# Patient Record
Sex: MALE | Race: WHITE | NOT HISPANIC OR LATINO | Employment: FULL TIME | URBAN - NONMETROPOLITAN AREA
[De-identification: names, ages, dates, MRNs, and addresses within clinical notes are randomized per-mention and may not be internally consistent; named-entity substitution may affect disease eponyms.]

---

## 2018-09-25 ENCOUNTER — OFFICE VISIT (OUTPATIENT)
Dept: FAMILY MEDICINE CLINIC | Facility: CLINIC | Age: 63
End: 2018-09-25

## 2018-09-25 VITALS
WEIGHT: 223.2 LBS | SYSTOLIC BLOOD PRESSURE: 108 MMHG | HEART RATE: 93 BPM | TEMPERATURE: 98.5 F | HEIGHT: 68 IN | BODY MASS INDEX: 33.83 KG/M2 | DIASTOLIC BLOOD PRESSURE: 70 MMHG | OXYGEN SATURATION: 95 %

## 2018-09-25 DIAGNOSIS — I25.118 CORONARY ARTERY DISEASE OF NATIVE ARTERY OF NATIVE HEART WITH STABLE ANGINA PECTORIS (HCC): ICD-10-CM

## 2018-09-25 DIAGNOSIS — M65.341 TRIGGER RING FINGER OF RIGHT HAND: ICD-10-CM

## 2018-09-25 DIAGNOSIS — G80.9 CEREBRAL PALSY, UNSPECIFIED TYPE (HCC): ICD-10-CM

## 2018-09-25 DIAGNOSIS — M25.532 LEFT WRIST PAIN: Primary | ICD-10-CM

## 2018-09-25 DIAGNOSIS — Z85.528 PERSONAL HISTORY OF RENAL CANCER: ICD-10-CM

## 2018-09-25 PROCEDURE — 99204 OFFICE O/P NEW MOD 45 MIN: CPT | Performed by: FAMILY MEDICINE

## 2018-09-25 RX ORDER — ALPRAZOLAM 0.5 MG/1
TABLET ORAL
Refills: 1 | COMMUNITY
Start: 2018-09-15 | End: 2018-12-04

## 2018-09-25 RX ORDER — METOPROLOL SUCCINATE 25 MG/1
25 TABLET, EXTENDED RELEASE ORAL DAILY
COMMUNITY
End: 2019-06-25 | Stop reason: SDUPTHER

## 2018-09-25 RX ORDER — ASPIRIN 81 MG/1
81 TABLET ORAL DAILY
COMMUNITY

## 2018-09-25 RX ORDER — LISINOPRIL AND HYDROCHLOROTHIAZIDE 20; 12.5 MG/1; MG/1
1 TABLET ORAL DAILY
COMMUNITY
End: 2019-01-28 | Stop reason: SDUPTHER

## 2018-09-25 RX ORDER — ISOSORBIDE MONONITRATE 30 MG/1
30 TABLET, EXTENDED RELEASE ORAL DAILY
COMMUNITY
End: 2019-06-25 | Stop reason: SDUPTHER

## 2018-09-25 RX ORDER — TRAZODONE HYDROCHLORIDE 50 MG/1
50-150 TABLET ORAL NIGHTLY
Qty: 90 TABLET | Refills: 11 | Status: SHIPPED | OUTPATIENT
Start: 2018-09-25 | End: 2019-04-17

## 2018-09-25 RX ORDER — ATORVASTATIN CALCIUM 20 MG/1
20 TABLET, FILM COATED ORAL NIGHTLY
COMMUNITY
End: 2019-03-14 | Stop reason: SDUPTHER

## 2018-09-25 NOTE — PROGRESS NOTES
Subjective   Jimmie Ortega is a 63 y.o. male.     History of Present Illness     New patient from [place].  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  Needs some referrals.  Due to stress of cancer, started using xanax to help him sleep.  Has been on 1.5 yrs. Says he is not addicted.  Has gone 2-3 days at most before taking xanax at bedtime.  His problem is falling asleep.  He has a trigger finger reoccur, needs referral.  Referral to nephrologist or urologist due to history of left nephrectomy 2017 due to cancer.  Last grf in 40's with serum creatinine 1.5  Needs referral to cardiologist due to coronary artery disease.  He has 100% occlusion in one native artery but good collateral flow so no stenting performed.  Before cad discovered, had pe left lung.  He was on blood thinner but after discussion and 23 tubes of blood to determine likelyhood of a repeat pe, they decided due to his age, he would be at more risk staying on a blood thinner for life.   He says he had some scarring in his left lung but is ok.  Hyperextended left wrist 6 weeks ago and ever since, wrist pain. 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 First wife  of liver cancer.  He is remarried.  New job preacher [employer]   Fh:  Mom lived age 93, dad  61    Review of Systems   Constitutional: Negative for chills, fatigue and fever.   HENT: Negative for congestion, ear discharge, ear pain, facial swelling, hearing loss, postnasal drip, rhinorrhea, sinus pressure, sore throat, trouble swallowing and voice change.    Eyes: Negative for discharge, redness and visual disturbance.   Respiratory: Negative for cough, chest tightness, shortness of breath and wheezing.    Cardiovascular:  "Negative for chest pain and palpitations.   Gastrointestinal: Negative for abdominal pain, blood in stool, constipation, diarrhea, nausea and vomiting.   Endocrine: Negative for polydipsia and polyuria.   Genitourinary: Negative for dysuria, flank pain, hematuria and urgency.   Musculoskeletal: Negative for arthralgias, back pain, joint swelling and myalgias.   Skin: Negative for rash.   Neurological: Negative for dizziness, weakness, numbness and headaches.   Hematological: Negative for adenopathy.   Psychiatric/Behavioral: Negative for confusion and sleep disturbance. The patient is not nervous/anxious.            /70 (BP Location: Left arm, Patient Position: Sitting, Cuff Size: Adult)   Pulse 93   Temp 98.5 °F (36.9 °C) (Temporal Artery )   Ht 172.2 cm (67.8\")   Wt 101 kg (223 lb 3.2 oz)   SpO2 95%   BMI 34.14 kg/m²       Objective     Physical Exam   Constitutional: He is oriented to person, place, and time. He appears well-developed and well-nourished.   HENT:   Head: Normocephalic and atraumatic.   Right Ear: External ear normal.   Left Ear: External ear normal.   Nose: Nose normal.   Eyes: Pupils are equal, round, and reactive to light. Conjunctivae and EOM are normal.   Neck: Normal range of motion.   Pulmonary/Chest: Effort normal.   Musculoskeletal: Normal range of motion.   Neurological: He is alert and oriented to person, place, and time.   Psychiatric: He has a normal mood and affect. His behavior is normal. Judgment and thought content normal.   Nursing note and vitals reviewed.          PAST MEDICAL HISTORY   No past medical history on file.   PAST SURGICAL HISTORY   No past surgical history on file.   SOCIAL HISTORY     Social History     Social History   • Marital status:      Social History Main Topics   • Smoking status: Never Smoker   • Smokeless tobacco: Never Used   • Alcohol use Yes   • Drug use: No   • Sexual activity: Defer     Other Topics Concern   • Not on file    "   ALLERGIES   Morphine   MEDICATIONS     Current Outpatient Prescriptions   Medication Sig Dispense Refill   • ALPRAZolam (XANAX) 0.5 MG tablet TK 1 T PO  QD PRF SLEEP  1   • aspirin 81 MG EC tablet Take 81 mg by mouth Daily.     • atorvastatin (LIPITOR) 20 MG tablet Take 20 mg by mouth Every Night.     • isosorbide mononitrate (IMDUR) 30 MG 24 hr tablet Take 30 mg by mouth Daily.     • lisinopril-hydrochlorothiazide (PRINZIDE,ZESTORETIC) 20-12.5 MG per tablet Take 1 tablet by mouth Daily.     • loratadine (CLARITIN) 5 MG chewable tablet Chew 5 mg Daily.     • metoprolol succinate XL (TOPROL-XL) 25 MG 24 hr tablet Take 25 mg by mouth Daily.     • traZODone (DESYREL) 50 MG tablet Take 1-3 tablets by mouth Every Night. 90 tablet 11     No current facility-administered medications for this visit.         The following portions of the patient's history were reviewed and updated as appropriate: allergies, current medications, past family history, past medical history, past social history, past surgical history and problem list.        Assessment/Plan   Jimmie was seen today for establish care.    Diagnoses and all orders for this visit:    Left wrist pain  -     XR Wrist 3+ View Left    Coronary artery disease of native artery of native heart with stable angina pectoris (CMS/HCC)  -     Ambulatory Referral to Cardiology    Personal history of renal cancer  -     Ambulatory Referral to Nephrology    Trigger ring finger of right hand  -     Ambulatory Referral to Orthopedic Surgery    Cerebral palsy, unspecified type (CMS/HCC)    Other orders  -     traZODone (DESYREL) 50 MG tablet; Take 1-3 tablets by mouth Every Night.      Discussed xanax, taper off. If cant go off I will give klonopin to help taper off  If needed go to sleep md.  Try trazodone    Referrals    Left wrist splint to wear when sleeping.  I see no fracture on xray.                 No Follow-up on file.                  This document has been electronically  signed by Jimmie Mathwe MD on September 25, 2018 5:09 PM

## 2018-09-25 NOTE — PATIENT INSTRUCTIONS

## 2018-09-28 ENCOUNTER — OFFICE VISIT (OUTPATIENT)
Dept: ORTHOPEDIC SURGERY | Facility: CLINIC | Age: 63
End: 2018-09-28

## 2018-09-28 VITALS — BODY MASS INDEX: 32.73 KG/M2 | HEIGHT: 69 IN | WEIGHT: 221 LBS

## 2018-09-28 DIAGNOSIS — M25.532 LEFT WRIST PAIN: Primary | ICD-10-CM

## 2018-09-28 DIAGNOSIS — M65.321 TRIGGER INDEX FINGER OF RIGHT HAND: ICD-10-CM

## 2018-09-28 DIAGNOSIS — S62.022A CLOSED DISPLACED FRACTURE OF MIDDLE THIRD OF SCAPHOID BONE OF LEFT WRIST, INITIAL ENCOUNTER: ICD-10-CM

## 2018-09-28 PROCEDURE — 99203 OFFICE O/P NEW LOW 30 MIN: CPT | Performed by: ORTHOPAEDIC SURGERY

## 2018-09-28 PROCEDURE — 25622 CLTX CARPL SCPHD FX W/O MNPJ: CPT | Performed by: ORTHOPAEDIC SURGERY

## 2018-09-28 PROCEDURE — 20550 NJX 1 TENDON SHEATH/LIGAMENT: CPT | Performed by: ORTHOPAEDIC SURGERY

## 2018-09-28 RX ORDER — TRIAMCINOLONE ACETONIDE 40 MG/ML
40 INJECTION, SUSPENSION INTRA-ARTICULAR; INTRAMUSCULAR
Status: COMPLETED | OUTPATIENT
Start: 2018-09-28 | End: 2018-09-28

## 2018-09-28 RX ORDER — LIDOCAINE HYDROCHLORIDE 10 MG/ML
1 INJECTION, SOLUTION INFILTRATION; PERINEURAL
Status: COMPLETED | OUTPATIENT
Start: 2018-09-28 | End: 2018-09-28

## 2018-09-28 RX ADMIN — TRIAMCINOLONE ACETONIDE 40 MG: 40 INJECTION, SUSPENSION INTRA-ARTICULAR; INTRAMUSCULAR at 14:33

## 2018-09-28 RX ADMIN — LIDOCAINE HYDROCHLORIDE 1 ML: 10 INJECTION, SOLUTION INFILTRATION; PERINEURAL at 14:33

## 2018-09-28 NOTE — PROGRESS NOTES
Jimmie Ortega is a 63 y.o. male   Primary provider:  Jimmie Mathew MD       Chief Complaint   Patient presents with   • Left Wrist - Wrist Pain       HISTORY OF PRESENT ILLNESS: XD 3-year-old male with a history of cerebral palsy affecting the left side mainly the upper extremity.  His left wrist pain started about 6 weeks ago, referred by Dr. Mathew. xrays done on 9/26/2018. Patient does remember an injury in he was moving recently and was moving some heavy boxes, the boxes came back and hyperextended his left wrist.  It got some better but then he saw Dr. mathew was concerned and referred him here with an x-ray which showed a questionable scaphoid fracture.  His left hand is uses a helping hand and sometimes moves when moving the right hand.  He is compensated very well with this throughout his life really has no active use of his left hand and really compensates for his right hand.  He also has a trigger finger on the long finger right hand which she has been concerned about.  He has issues with his neck and back. Patient wearing wrist splint.     Wrist Pain    The pain is present in the left wrist. The current episode started more than 1 month ago. There has been no history of extremity trauma. The problem occurs intermittently. The problem has been unchanged. The quality of the pain is described as aching. The pain is mild. Associated symptoms include numbness. Pertinent negatives include no fever. He has tried heat and cold for the symptoms.        CONCURRENT MEDICAL HISTORY:    Past Medical History:   Diagnosis Date   • Cardiac disease    • Cerebral palsy (CMS/HCC)    • Renal cancer (CMS/HCC)        Allergies   Allergen Reactions   • Morphine Nausea Only         Current Outpatient Prescriptions:   •  ALPRAZolam (XANAX) 0.5 MG tablet, TK 1 T PO  QD PRF SLEEP, Disp: , Rfl: 1  •  aspirin 81 MG EC tablet, Take 81 mg by mouth Daily., Disp: , Rfl:   •  atorvastatin (LIPITOR) 20 MG tablet, Take 20 mg by mouth  Every Night., Disp: , Rfl:   •  isosorbide mononitrate (IMDUR) 30 MG 24 hr tablet, Take 30 mg by mouth Daily., Disp: , Rfl:   •  lisinopril-hydrochlorothiazide (PRINZIDE,ZESTORETIC) 20-12.5 MG per tablet, Take 1 tablet by mouth Daily., Disp: , Rfl:   •  loratadine (CLARITIN) 5 MG chewable tablet, Chew 5 mg Daily., Disp: , Rfl:   •  metoprolol succinate XL (TOPROL-XL) 25 MG 24 hr tablet, Take 25 mg by mouth Daily., Disp: , Rfl:   •  traZODone (DESYREL) 50 MG tablet, Take 1-3 tablets by mouth Every Night., Disp: 90 tablet, Rfl: 11    Past Surgical History:   Procedure Laterality Date   • APPENDECTOMY     • NEPHRECTOMY     • TENDON REPAIR         Family History   Problem Relation Age of Onset   • Heart disease Other    • Hypertension Other         Social History     Social History   • Marital status:      Spouse name: N/A   • Number of children: N/A   • Years of education: N/A     Occupational History   • Not on file.     Social History Main Topics   • Smoking status: Never Smoker   • Smokeless tobacco: Never Used   • Alcohol use Yes   • Drug use: No   • Sexual activity: Defer     Other Topics Concern   • Not on file     Social History Narrative   • No narrative on file        Review of Systems   Constitutional: Positive for activity change. Negative for chills and fever.   HENT: Negative for facial swelling.    Eyes: Negative for photophobia.   Respiratory: Negative for apnea and shortness of breath.    Cardiovascular: Negative for chest pain and leg swelling.   Gastrointestinal: Negative for abdominal pain, nausea and vomiting.   Genitourinary: Negative for dysuria.   Musculoskeletal: Positive for arthralgias, back pain, gait problem, joint swelling, neck pain and neck stiffness.        Joint  Pain, muscle pain   Skin: Negative for color change and rash.   Neurological: Positive for weakness and numbness. Negative for seizures and syncope.        Tingling   Psychiatric/Behavioral: Negative for behavioral  "problems and dysphoric mood.   All other systems reviewed and are negative.      PHYSICAL EXAMINATION:       Ht 174 cm (68.5\")   Wt 100 kg (221 lb)   BMI 33.11 kg/m²     Physical Exam   Constitutional: He is oriented to person, place, and time. He appears well-developed and well-nourished.   HENT:   Head: Normocephalic and atraumatic.   Eyes: Pupils are equal, round, and reactive to light. EOM are normal.   Neck: Neck supple. No tracheal deviation present.   Pulmonary/Chest: Effort normal.   Musculoskeletal: Normal range of motion. He exhibits tenderness. He exhibits no edema.   Neurological: He is alert and oriented to person, place, and time. A sensory deficit is present. He exhibits abnormal muscle tone.   Skin: Skin is warm and dry. No erythema.   Psychiatric: He has a normal mood and affect.   Vitals reviewed.      GAIT:     []  Normal  []  Antalgic    Assistive device: [x]  None  []  Walker     []  Crutches  []  Cane     []  Wheelchair  []  Stretcher    Ortho Exam  Triggering noted in the long finger right hand tender A1 pulley neurologically intact motion fairly well preserved.    He is tender over the anatomic snuffbox on the left side decreased motion is here.  He has decreased flexion and extension pronation supination.    Xr Wrist 3+ View Left    Result Date: 9/26/2018  Narrative: Procedure: XR WRIST 3 OR MORE VIEWS. History: injured wrist., M25.532 Pain in left wrist. Comparison: None Findings: Three views of the left wrist were obtained. There is a transverse fracture through the waist of the scaphoid. The fracture is nondisplaced. Ill-defined areas of lucency around the fracture may be due to developing osteonecrosis. There is soft tissue swelling around the wrist.     Impression: Impression: Nondisplaced scaphoid fracture with associated ill-defined lucencies around the fracture suspicious for developing osteonecrosis. Electronically signed by:  Lorenzo Keating MD  9/26/2018 4:32 PM CDT Workstation: " [id number]  And looking at his x-rays he also has somewhat of a instability pattern with dorsal angulation of the carpus at the capitate lunate joint.  There is mild arthritic change noted here.  There is a impacted scaphoid fracture which looks subacute with mild sclerosis.  Small Joint Arthrocentesis  Consent given by: patient  Timeout: Immediately prior to procedure a time out was called to verify the correct patient, procedure, equipment, support staff and site/side marked as required   Supporting Documentation  Indications: pain   Procedure Details  Location: index finger (right ) -   Needle size: 25 G  Medications administered: 1 mL lidocaine 1 %; 40 mg triamcinolone acetonide 40 MG/ML                ASSESSMENT:    Diagnoses and all orders for this visit:    Left wrist pain    Trigger index finger of right hand  -     Small Joint Arthrocentesis    Closed displaced fracture of middle third of scaphoid bone of left wrist, initial encounter    Other orders  -     Cancel: Small Joint Arthrocentesis  -     Cancel: Medium Joint Arthrocentesis          PLAN I will inject the trigger fingers 11 mixture of Xylocaine and all goes above.  He will ice it down tonight and will follow this along.  With regard to the scaphoid fracture with an immobilizer in thumb spica splint he has a volar splint at this point.  I would be concerned that he may have an old chronic deformity and he tells me that he wouldn't necessarily have noticed that in the past.  He may have had repetitive traumatic injuries but nothing really slowed him down with regard to his wrist.  With limited function he has a don't think that he needs a operation on this at this point.  I do see him back in 4 weeks x-raying arrival of his left wrist.  He is give me a folder of CT scans information regarding his back and his neck as well as some leg length discrepancy on the left side.  I've put him I will go over these will discuss this further when we returns in 4  weeks.  We spent over 30 minutes discussing this today.    No Follow-up on file.    Kvng Mann MD

## 2018-10-08 ENCOUNTER — OFFICE VISIT (OUTPATIENT)
Dept: CARDIOLOGY | Facility: CLINIC | Age: 63
End: 2018-10-08

## 2018-10-08 VITALS
HEIGHT: 69 IN | SYSTOLIC BLOOD PRESSURE: 130 MMHG | OXYGEN SATURATION: 99 % | HEART RATE: 86 BPM | DIASTOLIC BLOOD PRESSURE: 80 MMHG | BODY MASS INDEX: 33.02 KG/M2 | WEIGHT: 222.9 LBS

## 2018-10-08 DIAGNOSIS — E78.2 MIXED HYPERLIPIDEMIA: ICD-10-CM

## 2018-10-08 DIAGNOSIS — I25.10 CORONARY ARTERY DISEASE INVOLVING NATIVE CORONARY ARTERY OF NATIVE HEART WITHOUT ANGINA PECTORIS: Primary | ICD-10-CM

## 2018-10-08 DIAGNOSIS — E66.09 CLASS 1 OBESITY DUE TO EXCESS CALORIES WITHOUT SERIOUS COMORBIDITY WITH BODY MASS INDEX (BMI) OF 34.0 TO 34.9 IN ADULT: ICD-10-CM

## 2018-10-08 DIAGNOSIS — E66.09 CLASS 1 OBESITY DUE TO EXCESS CALORIES WITHOUT SERIOUS COMORBIDITY WITH BODY MASS INDEX (BMI) OF 33.0 TO 33.9 IN ADULT: ICD-10-CM

## 2018-10-08 DIAGNOSIS — I10 ESSENTIAL HYPERTENSION: ICD-10-CM

## 2018-10-08 PROCEDURE — 93000 ELECTROCARDIOGRAM COMPLETE: CPT | Performed by: INTERNAL MEDICINE

## 2018-10-08 PROCEDURE — 99204 OFFICE O/P NEW MOD 45 MIN: CPT | Performed by: INTERNAL MEDICINE

## 2018-10-08 NOTE — PATIENT INSTRUCTIONS

## 2018-10-08 NOTE — PROGRESS NOTES
Cardiovascular Medicine      Kvng Frank M.D., Ph.D., [facility]         Jimmie Mathew MD  [address] [place] [address], [place] [address]    Thank you for asking me to see Jimmie Ortega for ASCAD.    History of Present Illness  This is a 63 y.o. male with:    1. ASCAD  A. CPS with MPS abnormal  [facility], 6/2017   -mLAD: 100% with R-L and L-L collateralization  - considered, but not performed   -pLAD: 50-60%  -D1: 30%  -D2: 80%, small vessel  -RCA: non-obstructive  2.  Dyslipidemia  3.  Hypertension, essential managed by primary care  4.  Obesity  5. Prior PE    Jimmie Ortega is a 63 y.o. male who presents for consultation today.  He's recently moved to the area.  He is a preacher.  He established care with Dr. Mathew.  He reported a history of atherosclerotic coronary artery disease including 100% mLAD lesion with collateralization from L-L and R-L. He has had a small D2 that was 80%.  He was having angina which prompted a myocardial perfusion scintigraphy with inducible anterior ischemia.  He underwent invasive coronary angiography with the above findings.   was discussed, but he became asymptomatic with antianginal medications, so he opted for ongoing medication therapy.  He's had no history of stenting or bypass surgery.  He is currently prescribed aspirin, isosorbide mononitrate, Toprol-XL and atorvastatin.  He's had no resting, exertional or nocturnal angina.  Concerning his dyslipidemia he is on atorvastatin.  This is prescribed as primary care provider.  He's tolerating this well.  He is asymptomatic from a cardiovascular standpoint.    Review of Systems - Review of Systems   Cardiovascular: Negative.    Respiratory: Negative.         All other systems were reviewed and were negative.    family history includes Heart disease in his other; Hypertension in his other.     reports that he has never smoked. He has never used smokeless tobacco. He reports that he drinks alcohol. He reports  that he does not use drugs.    Allergies   Allergen Reactions   • Morphine Nausea Only         Current Outpatient Prescriptions:   •  ALPRAZolam (XANAX) 0.5 MG tablet, TK 1 T PO  QD PRF SLEEP, Disp: , Rfl: 1  •  aspirin 81 MG EC tablet, Take 81 mg by mouth Daily., Disp: , Rfl:   •  atorvastatin (LIPITOR) 20 MG tablet, Take 20 mg by mouth Every Night., Disp: , Rfl:   •  isosorbide mononitrate (IMDUR) 30 MG 24 hr tablet, Take 30 mg by mouth Daily., Disp: , Rfl:   •  lisinopril-hydrochlorothiazide (PRINZIDE,ZESTORETIC) 20-12.5 MG per tablet, Take 1 tablet by mouth Daily., Disp: , Rfl:   •  loratadine (CLARITIN) 5 MG chewable tablet, Chew 5 mg Daily., Disp: , Rfl:   •  metoprolol succinate XL (TOPROL-XL) 25 MG 24 hr tablet, Take 25 mg by mouth Daily., Disp: , Rfl:   •  traZODone (DESYREL) 50 MG tablet, Take 1-3 tablets by mouth Every Night., Disp: 90 tablet, Rfl: 11    Physical Exam:  Vitals:    10/08/18 1347   BP: 130/80   Pulse:    SpO2:      Body mass index is 33.4 kg/m².   Last Weights:   Wt Readings from Last 3 Encounters:   09/28/18 100 kg (221 lb)   09/25/18 101 kg (223 lb 3.2 oz)     Pulse Ox: Normal   General: alert, appears stated age and cooperative  Body Habitus: obese  HEENT: Head: Normocephalic, no lesions, without obvious abnormality. No arcus senilis, xanthelasma or xanthomas.  Neuro: VII: upper facial muscle function normal bilaterally, VII: lower facial muscle function normal bilaterally, VIII: hearing normal  JVP: 5 cm of water at 45 degrees   Volume/Pulsation: Normal  Carotid Exam: normal pulsation bilaterally   Carotid Volume: normal    Subclavian Bruit: absent  Vertebral Bruit: absent   Chest:  Normal Excursion: Good    I:E: normal  Pulmonary:Normal     Precordium: Normal impulses. P2 is not palpable.   [place]:  normal size and placement Palpable S4: absent.  Heart rate: normal    Heart Rhythm: regular     Heart Sounds: S1: normal intensity  S2: normal intensity  S3: absent   S4: absent  Opening  Snap: absent  A2-OS:  absent.   Pericardial rub: absent    Ejection click: None      Murmurs:  absent   Extremity: moves all extremities equally.   LE Skin: normal exam; no erythema, swelling or tenderness; Normal  LE Pulses: well perfused with normal pulses in the distal extremities  Pulses: 2+ and symmetric    DATA REVIEWED:       ECG 12 Lead  Date/Time: 10/8/2018 2:00 PM  Performed by: NIKKI MOHSER  Authorized by: NIKKI MOSHER   Previous ECG: no previous ECG available  Rhythm: sinus rhythm  Rate: normal  Conduction: conduction normal  ST segment elevation noted on lead: NS ST-changes.  QRS axis: normal  Clinical impression: abnormal ECG            [facility] reviewed.           --------------------------------------------------------------------------------------------------  LABS:   No results found for: GLUCOSE, BUN, CREATININE, EGFRIFNONA, EGFRIFAFRI, BCR, K, CO2, CALCIUM, PROTENTOTREF, ALBUMIN, LABIL2, AST, ALT  No results found for: WBC, HGB, HCT, MCV, PLT  No results found for: CHOL, CHLPL, TRIG, HDL, LDL, LDLDIRECT  No results found for: TSH, F7AOUID, Z2LUVUL, THYROIDAB  No results found for: CKTOTAL, CKMB, CKMBINDEX, TROPONINI, TROPONINT  No results found for: HGBA1C  No results found for: DDIMER  No results found for: ALT  No results found for: HGBA1C  No results found for: GLUF, MICROALBUR, CREATININE  No results found for: IRON, TIBC, FERRITIN  No results found for: INR, PROTIME    Assessment/Plan      Diagnosis Plan   1. Coronary artery disease involving native coronary artery of native heart without angina pectoris, CCS 0.   · Continue aspirin, statin and beta blocker   · Imdur  · Call for concerning symptoms    2. Mixed hyperlipidemia    · Dietary changes: Increase soluble fiber  · Reduce saturated fat and cholesterol  · Exercise changes: Advised to engage in aerobic exercise on most days of the week  · Statin: Yes  · ASA: Yes  · Continue follow-up visits with PCP for monitoring of  labs       3. Class 1 obesity due to excess calories without serious comorbidity with body mass index (BMI) of 34.0 to 34.9 in adult  · General patient education:  -Weight loss hand-out  -Exercise intervention:   Hand out, increase aerobic activity  -PCP Follow-up             4.  HTN. 2D TTE PTNA         Return in about 1 year (around 10/8/2019).

## 2018-10-26 DIAGNOSIS — S62.022A CLOSED DISPLACED FRACTURE OF MIDDLE THIRD OF SCAPHOID BONE OF LEFT WRIST, INITIAL ENCOUNTER: Primary | ICD-10-CM

## 2018-10-29 ENCOUNTER — OFFICE VISIT (OUTPATIENT)
Dept: ORTHOPEDIC SURGERY | Facility: CLINIC | Age: 63
End: 2018-10-29

## 2018-10-29 VITALS — BODY MASS INDEX: 33.95 KG/M2 | HEIGHT: 68 IN | WEIGHT: 224 LBS

## 2018-10-29 DIAGNOSIS — M54.5 LOW BACK PAIN, UNSPECIFIED BACK PAIN LATERALITY, UNSPECIFIED CHRONICITY, WITH SCIATICA PRESENCE UNSPECIFIED: ICD-10-CM

## 2018-10-29 DIAGNOSIS — M47.816 LUMBAR SPONDYLOSIS: ICD-10-CM

## 2018-10-29 DIAGNOSIS — M54.2 NECK PAIN: ICD-10-CM

## 2018-10-29 DIAGNOSIS — S62.022D CLOSED DISPLACED FRACTURE OF MIDDLE THIRD OF SCAPHOID OF LEFT WRIST WITH ROUTINE HEALING, SUBSEQUENT ENCOUNTER: Primary | ICD-10-CM

## 2018-10-29 PROBLEM — M54.50 LOW BACK PAIN: Status: ACTIVE | Noted: 2018-10-29

## 2018-10-29 PROCEDURE — 99213 OFFICE O/P EST LOW 20 MIN: CPT | Performed by: ORTHOPAEDIC SURGERY

## 2018-10-29 PROCEDURE — 99024 POSTOP FOLLOW-UP VISIT: CPT | Performed by: ORTHOPAEDIC SURGERY

## 2018-10-29 NOTE — PROGRESS NOTES
"Jimmie Ortega is a 63 y.o. male returns for     Chief Complaint   Patient presents with   • Left Wrist - Follow-up       HISTORY OF PRESENT ILLNESS: Patient is here today for recheck of left wrist fracture. Patient was sent to xray upon arrival. Patient has no complaints of pain today. 's wrist is improved certainly more than it was 3 weeks ago.  He still has very limited function.  He also discussed the back pain these hadn't been to several courses of physical therapy does limit his walking hurts him on a daily basis.  He gets better with therapy but he hasn't really been good maintaining after this.  He gets to go after rest and has less pain and discomfort that point.       CONCURRENT MEDICAL HISTORY:    The following portions of the patient's history were reviewed and updated as appropriate: allergies, current medications, past family history, past medical history, past social history, past surgical history and problem list.     ROS  No fevers or chills.  No chest pain or shortness of air.  No GI or  disturbances.    PHYSICAL EXAMINATION:       Ht 172.7 cm (68\")   Wt 102 kg (224 lb)   BMI 34.06 kg/m²     Physical Exam   Constitutional: He is oriented to person, place, and time. He appears well-developed.   HENT:   Head: Normocephalic and atraumatic.   Eyes: Pupils are equal, round, and reactive to light. EOM are normal.   Neck: Neck supple. No tracheal deviation present.   Pulmonary/Chest: Effort normal.   Musculoskeletal: He exhibits tenderness. He exhibits no edema or deformity.   Neurological: He is alert and oriented to person, place, and time. He exhibits abnormal muscle tone.   Skin: Skin is warm and dry. No erythema.   Psychiatric: He has a normal mood and affect.   Vitals reviewed.      GAIT:     [x]  Normal  []  Antalgic    Assistive device: [x]  None  []  Walker     []  Crutches  []  Cane     []  Wheelchair  []  Stretcher    Ortho Exam  Minimally tender moves his fingers well.  " Neurologically unchanged as well.    No results found.  X-rays show a little bit of collapse and deformity no evidence of callus.  Not a lot of progress in any event of healing.         ASSESSMENT:    Diagnoses and all orders for this visit:    Closed displaced fracture of middle third of scaphoid of left wrist with routine healing, subsequent encounter    Low back pain, unspecified back pain laterality, unspecified chronicity, with sciatica presence unspecified  -     Ambulatory Referral to Physical Therapy Evaluate and treat    Neck pain  -     Ambulatory Referral to Physical Therapy Evaluate and treat    Lumbar spondylosis          PLAN physical therapy for his back and work for a home program.  Perhaps do a fitness formula.  With regard to the risks were discussed this.  I don't think is indicated at this fixed and understands that it may not heal.  He is very low demand it is really do anything with this wrist.  In the event that was still painful 6-12 months down the road he may benefit from a proximal row carpectomy or something of this nature.  I think it's with a very limited use he has I think no reason for open reduction internal fixation at this time.  He understands it may not heal and again clinically it is much better now than it was 3 weeks ago we'll see him x-ray arrival in 6 weeks.    No Follow-up on file.    Kvng Mann MD

## 2018-11-05 ENCOUNTER — TELEPHONE (OUTPATIENT)
Dept: FAMILY MEDICINE CLINIC | Facility: CLINIC | Age: 63
End: 2018-11-05

## 2018-11-05 DIAGNOSIS — Z90.5 SINGLE KIDNEY: Primary | ICD-10-CM

## 2018-11-06 ENCOUNTER — LAB (OUTPATIENT)
Dept: LAB | Facility: HOSPITAL | Age: 63
End: 2018-11-06

## 2018-11-06 DIAGNOSIS — Z90.5 SINGLE KIDNEY: ICD-10-CM

## 2018-11-06 PROCEDURE — 80069 RENAL FUNCTION PANEL: CPT | Performed by: FAMILY MEDICINE

## 2018-11-07 LAB
ALBUMIN SERPL-MCNC: 4.3 G/DL (ref 3.4–4.8)
ANION GAP SERPL CALCULATED.3IONS-SCNC: 10 MMOL/L (ref 5–15)
BUN BLD-MCNC: 29 MG/DL (ref 7–21)
BUN/CREAT SERPL: 22.1 (ref 7–25)
CALCIUM SPEC-SCNC: 9.5 MG/DL (ref 8.4–10.2)
CHLORIDE SERPL-SCNC: 104 MMOL/L (ref 95–110)
CO2 SERPL-SCNC: 20 MMOL/L (ref 22–31)
CREAT BLD-MCNC: 1.31 MG/DL (ref 0.7–1.3)
GFR SERPL CREATININE-BSD FRML MDRD: 55 ML/MIN/1.73 (ref 49–113)
GLUCOSE BLD-MCNC: 108 MG/DL (ref 60–100)
PHOSPHATE SERPL-MCNC: 3.6 MG/DL (ref 2.4–4.4)
POTASSIUM BLD-SCNC: 4.6 MMOL/L (ref 3.5–5.1)
SODIUM BLD-SCNC: 134 MMOL/L (ref 137–145)

## 2018-12-04 ENCOUNTER — OFFICE VISIT (OUTPATIENT)
Dept: FAMILY MEDICINE CLINIC | Facility: CLINIC | Age: 63
End: 2018-12-04

## 2018-12-04 VITALS
TEMPERATURE: 97.5 F | HEIGHT: 68 IN | SYSTOLIC BLOOD PRESSURE: 110 MMHG | OXYGEN SATURATION: 95 % | DIASTOLIC BLOOD PRESSURE: 80 MMHG | WEIGHT: 224.6 LBS | HEART RATE: 90 BPM | BODY MASS INDEX: 34.04 KG/M2

## 2018-12-04 DIAGNOSIS — J40 BRONCHITIS: Primary | ICD-10-CM

## 2018-12-04 PROCEDURE — 94640 AIRWAY INHALATION TREATMENT: CPT | Performed by: FAMILY MEDICINE

## 2018-12-04 PROCEDURE — 99214 OFFICE O/P EST MOD 30 MIN: CPT | Performed by: FAMILY MEDICINE

## 2018-12-04 RX ORDER — ALBUTEROL SULFATE 90 UG/1
2 AEROSOL, METERED RESPIRATORY (INHALATION) EVERY 4 HOURS PRN
Qty: 1 INHALER | Refills: 11 | Status: SHIPPED | OUTPATIENT
Start: 2018-12-04 | End: 2019-10-16

## 2018-12-04 RX ORDER — AZITHROMYCIN 250 MG/1
TABLET, FILM COATED ORAL
Qty: 6 TABLET | Refills: 0 | Status: SHIPPED | OUTPATIENT
Start: 2018-12-04 | End: 2018-12-26

## 2018-12-04 RX ORDER — IPRATROPIUM BROMIDE AND ALBUTEROL SULFATE 2.5; .5 MG/3ML; MG/3ML
3 SOLUTION RESPIRATORY (INHALATION) AS NEEDED
Status: COMPLETED | OUTPATIENT
Start: 2018-12-04 | End: 2018-12-04

## 2018-12-04 RX ORDER — PREDNISONE 20 MG/1
TABLET ORAL
Qty: 10 TABLET | Refills: 0 | Status: SHIPPED | OUTPATIENT
Start: 2018-12-04 | End: 2018-12-26

## 2018-12-04 RX ADMIN — IPRATROPIUM BROMIDE AND ALBUTEROL SULFATE 3 ML: 2.5; .5 SOLUTION RESPIRATORY (INHALATION) at 10:57

## 2018-12-04 NOTE — PROGRESS NOTES
" Subjective   Jimmie Ortega is a 63 y.o. male.     History of Present Illness     Cough, post nasal drip several days.  Was told he needed to be seen quickly for this due to history of pe.  He says he usually gets a zpack.     Review of Systems   Constitutional: Positive for chills.   HENT: Positive for postnasal drip and sore throat.    Respiratory: Positive for cough, shortness of breath and wheezing.    Cardiovascular: Negative for chest pain.   Neurological: Positive for headaches.           /80 (BP Location: Left arm, Patient Position: Sitting, Cuff Size: Adult)   Pulse 90   Temp 97.5 °F (36.4 °C) (Temporal)   Ht 172.7 cm (67.99\")   Wt 102 kg (224 lb 9.6 oz)   SpO2 95%   BMI 34.16 kg/m²       Objective     Physical Exam   Constitutional: He is oriented to person, place, and time. He appears well-developed and well-nourished.   HENT:   Head: Normocephalic and atraumatic.   Right Ear: External ear normal.   Left Ear: External ear normal.   Nose: Nose normal.   Mouth/Throat: Oropharynx is clear and moist.   Eyes: Conjunctivae and EOM are normal. Pupils are equal, round, and reactive to light.   Neck: Normal range of motion. Neck supple.   Cardiovascular: Normal rate, regular rhythm and normal heart sounds. Exam reveals no gallop and no friction rub.   No murmur heard.  Pulmonary/Chest: Effort normal and breath sounds normal.   Prolong exp phase   Abdominal: Soft. Bowel sounds are normal. He exhibits no distension. There is no tenderness. There is no rebound and no guarding.   Musculoskeletal: Normal range of motion. He exhibits no edema or deformity.   Neurological: He is alert and oriented to person, place, and time. No cranial nerve deficit.   Skin: Skin is warm and dry. No rash noted. No erythema.   Psychiatric: He has a normal mood and affect. His behavior is normal. Judgment and thought content normal.   Nursing note and vitals reviewed.          PAST MEDICAL HISTORY     Past Medical " History:   Diagnosis Date   • Cardiac disease    • Cerebral palsy (CMS/HCC)    • Renal cancer (CMS/HCC)       PAST SURGICAL HISTORY     Past Surgical History:   Procedure Laterality Date   • APPENDECTOMY     • CARDIAC CATHETERIZATION     • NEPHRECTOMY     • TENDON REPAIR        SOCIAL HISTORY     Social History     Socioeconomic History   • Marital status:      Spouse name: Not on file   • Number of children: Not on file   • Years of education: Not on file   • Highest education level: Not on file   Tobacco Use   • Smoking status: Never Smoker   • Smokeless tobacco: Never Used   Substance and Sexual Activity   • Alcohol use: Yes     Comment: occasional   • Drug use: No   • Sexual activity: Defer      ALLERGIES   Morphine   MEDICATIONS     Current Outpatient Medications   Medication Sig Dispense Refill   • aspirin 81 MG EC tablet Take 81 mg by mouth Daily.     • atorvastatin (LIPITOR) 20 MG tablet Take 20 mg by mouth Every Night.     • isosorbide mononitrate (IMDUR) 30 MG 24 hr tablet Take 30 mg by mouth Daily.     • lisinopril-hydrochlorothiazide (PRINZIDE,ZESTORETIC) 20-12.5 MG per tablet Take 1 tablet by mouth Daily.     • metoprolol succinate XL (TOPROL-XL) 25 MG 24 hr tablet Take 25 mg by mouth Daily.     • traZODone (DESYREL) 50 MG tablet Take 1-3 tablets by mouth Every Night. 90 tablet 11   • albuterol 108 (90 Base) MCG/ACT inhaler Inhale 2 puffs Every 4 (Four) Hours As Needed for Wheezing. 1 inhaler 11   • azithromycin (ZITHROMAX) 250 MG tablet Take 2 tablets the first day, then 1 tablet daily for 4 days. 6 tablet 0   • predniSONE (DELTASONE) 20 MG tablet 40mg qd 5 days. 10 tablet 0     No current facility-administered medications for this visit.         The following portions of the patient's history were reviewed and updated as appropriate: allergies, current medications, past family history, past medical history, past social history, past surgical history and problem list.        Assessment/Plan    Jimmie was seen today for uri.    Diagnoses and all orders for this visit:    Bronchitis  -     ipratropium-albuterol (DUO-NEB) nebulizer solution 3 mL; Take 3 mL by nebulization As Needed for Shortness of Air.    Other orders  -     albuterol 108 (90 Base) MCG/ACT inhaler; Inhale 2 puffs Every 4 (Four) Hours As Needed for Wheezing.  -     predniSONE (DELTASONE) 20 MG tablet; 40mg qd 5 days.  -     azithromycin (ZITHROMAX) 250 MG tablet; Take 2 tablets the first day, then 1 tablet daily for 4 days.                       No Follow-up on file.                  This document has been electronically signed by Jimmie Mathew MD on December 4, 2018 1:17 PM

## 2018-12-07 DIAGNOSIS — M25.532 LEFT WRIST PAIN: Primary | ICD-10-CM

## 2018-12-10 ENCOUNTER — OFFICE VISIT (OUTPATIENT)
Dept: ORTHOPEDIC SURGERY | Facility: CLINIC | Age: 63
End: 2018-12-10

## 2018-12-10 ENCOUNTER — TELEPHONE (OUTPATIENT)
Dept: FAMILY MEDICINE CLINIC | Facility: CLINIC | Age: 63
End: 2018-12-10

## 2018-12-10 VITALS — HEIGHT: 67 IN | BODY MASS INDEX: 35.16 KG/M2 | WEIGHT: 224 LBS

## 2018-12-10 DIAGNOSIS — S62.022D CLOSED DISPLACED FRACTURE OF MIDDLE THIRD OF SCAPHOID OF LEFT WRIST WITH ROUTINE HEALING, SUBSEQUENT ENCOUNTER: Primary | ICD-10-CM

## 2018-12-10 DIAGNOSIS — M47.816 LUMBAR SPONDYLOSIS: ICD-10-CM

## 2018-12-10 DIAGNOSIS — M43.06 LUMBAR SPONDYLOLYSIS: ICD-10-CM

## 2018-12-10 DIAGNOSIS — M47.812 OSTEOARTHRITIS OF CERVICAL SPINE, UNSPECIFIED SPINAL OSTEOARTHRITIS COMPLICATION STATUS: ICD-10-CM

## 2018-12-10 PROCEDURE — 99212 OFFICE O/P EST SF 10 MIN: CPT | Performed by: ORTHOPAEDIC SURGERY

## 2018-12-10 PROCEDURE — 99024 POSTOP FOLLOW-UP VISIT: CPT | Performed by: ORTHOPAEDIC SURGERY

## 2018-12-10 NOTE — PROGRESS NOTES
"Jimmie Ortega is a 63 y.o. male returns for     Chief Complaint   Patient presents with   • Left Wrist - Follow-up       HISTORY OF PRESENT ILLNESS: Patient is here today for recheck of left wrist fracture. Patient was sent to xray upon arrival.  He is almost 2 months out at this point has occasional pain when he is taking or pushing the car door and things like that with his left hand.  Overall he is doing much better.  Still having some problems with his back.  The back is bothering him for years he's been told he had a degenerative scoliosis and certainly has some atrophy and weakness in the left leg which is not as effective as the left upper extremity.  He's had issues with his neck as well and is seeing a specialist when he was told he could have effusion but is gotten better without that.  We requested physical therapy last month and he was contacted by [facility] but nobody ever called him back.       CONCURRENT MEDICAL HISTORY:    The following portions of the patient's history were reviewed and updated as appropriate: allergies, current medications, past family history, past medical history, past social history, past surgical history and problem list.     ROS  No fevers or chills.  No chest pain or shortness of air.  No GI or  disturbances.    PHYSICAL EXAMINATION:       Ht 170.2 cm (67\")   Wt 102 kg (224 lb)   BMI 35.08 kg/m²     Physical Exam   Constitutional: He is oriented to person, place, and time. He appears well-developed and well-nourished.   HENT:   Head: Normocephalic and atraumatic.   Eyes: EOM are normal. Pupils are equal, round, and reactive to light.   Neck: Neck supple. No tracheal deviation present.   Pulmonary/Chest: Effort normal.   Musculoskeletal: He exhibits tenderness. He exhibits no edema or deformity.   Neurological: He is alert and oriented to person, place, and time.   Skin: Skin is warm and dry. No erythema.   Psychiatric: He has a normal mood and affect. "   Vitals reviewed.      GAIT:     []  Normal  []  Antalgic    Assistive device: []  None  []  Walker     []  Crutches  []  Cane     []  Wheelchair  []  Stretcher    Ortho Exam   Mild pain with lateral bending to the left and extension nonoperatively tender in the lumbar spine.  Some limitation motion neck perhaps 60% of normal.  Good motion of the hips without pain.  Neurologically intact.  Minimally tender to palpation over the left scaphoid some pain with extension.  Neurologically unchanged.    No results found.  Reviews the scaphoid show no significant change is mild displacement and collapse of the scaphoid no obvious healing.        ASSESSMENT:    Diagnoses and all orders for this visit:    Closed displaced fracture of middle third of scaphoid of left wrist with routine healing, subsequent encounter    Osteoarthritis of cervical spine, unspecified spinal osteoarthritis complication status  -     Ambulatory Referral to Physical Therapy    Lumbar spondylolysis  -     Ambulatory Referral to Physical Therapy    Lumbar spondylosis          PLAN we'll derangement for him.  Physical therapist here which in follow-up in [place].  With regard to his wrist on the start coming out of his splint.  He is really not interested in having anything else done at think it's reasonable since he has very limited utility of his left upper show any.  In the event that it still hurt their options we discussed previously over the hand guys maybe even consider something such as a proximal row carpectomy.  He'll call with any issues I will see him at any time.    No Follow-up on file.    Kvng Mann MD

## 2018-12-10 NOTE — TELEPHONE ENCOUNTER
PATIENT SAID HE IS NOT BETTER.  HE DIDN'T KNOW IF HE SHOULD TAKE ANOTHER ROUND OF ANTIBIOTICS OR SHOULD HE COME IN FOR APPT.  JADE VARMA

## 2018-12-11 ENCOUNTER — OFFICE VISIT (OUTPATIENT)
Dept: FAMILY MEDICINE CLINIC | Facility: CLINIC | Age: 63
End: 2018-12-11

## 2018-12-11 VITALS
OXYGEN SATURATION: 95 % | HEART RATE: 96 BPM | TEMPERATURE: 97.3 F | SYSTOLIC BLOOD PRESSURE: 120 MMHG | BODY MASS INDEX: 35.66 KG/M2 | HEIGHT: 67 IN | WEIGHT: 227.2 LBS | DIASTOLIC BLOOD PRESSURE: 80 MMHG

## 2018-12-11 DIAGNOSIS — J40 BRONCHITIS: Primary | ICD-10-CM

## 2018-12-11 PROCEDURE — 99213 OFFICE O/P EST LOW 20 MIN: CPT | Performed by: FAMILY MEDICINE

## 2018-12-11 RX ORDER — CYPROHEPTADINE HYDROCHLORIDE 2 MG/5ML
2-4 SOLUTION ORAL EVERY 8 HOURS
Qty: 240 ML | Refills: 12 | Status: SHIPPED | OUTPATIENT
Start: 2018-12-11 | End: 2019-10-16

## 2018-12-11 NOTE — PATIENT INSTRUCTIONS

## 2018-12-11 NOTE — PROGRESS NOTES
" Subjective   Jimmie Ortega is a 63 y.o. male.     History of Present Illness     Feeling better but still drainage back of throat.   Worries pneumonia due to history of pe.    Review of Systems   Constitutional: Negative for chills, fatigue and fever.   HENT: Positive for postnasal drip. Negative for congestion, ear discharge, ear pain, facial swelling, hearing loss, rhinorrhea, sinus pressure, sore throat, trouble swallowing and voice change.    Eyes: Negative for discharge, redness and visual disturbance.   Respiratory: Negative for cough, chest tightness, shortness of breath and wheezing.    Cardiovascular: Negative for chest pain and palpitations.   Gastrointestinal: Negative for abdominal pain, blood in stool, constipation, diarrhea, nausea and vomiting.   Endocrine: Negative for polydipsia and polyuria.   Genitourinary: Negative for dysuria, flank pain, hematuria and urgency.   Musculoskeletal: Negative for arthralgias, back pain, joint swelling and myalgias.   Skin: Negative for rash.   Neurological: Negative for dizziness, weakness, numbness and headaches.   Hematological: Negative for adenopathy.   Psychiatric/Behavioral: Negative for confusion and sleep disturbance. The patient is not nervous/anxious.            /80 (BP Location: Left arm, Patient Position: Sitting, Cuff Size: Adult)   Pulse 96   Temp 97.3 °F (36.3 °C) (Temporal)   Ht 170.2 cm (67.01\")   Wt 103 kg (227 lb 3.2 oz)   SpO2 95%   BMI 35.58 kg/m²       Objective     Physical Exam   Constitutional: He is oriented to person, place, and time. He appears well-developed and well-nourished.   HENT:   Head: Normocephalic and atraumatic.   Right Ear: External ear normal.   Left Ear: External ear normal.   Nose: Nose normal.   Eyes: Conjunctivae and EOM are normal. Pupils are equal, round, and reactive to light.   Neck: Normal range of motion.   Pulmonary/Chest: Effort normal.   Musculoskeletal: Normal range of motion.   Neurological: " He is alert and oriented to person, place, and time.   Psychiatric: He has a normal mood and affect. His behavior is normal. Judgment and thought content normal.   Nursing note and vitals reviewed.          PAST MEDICAL HISTORY     Past Medical History:   Diagnosis Date   • Cardiac disease    • Cerebral palsy (CMS/HCC)    • Renal cancer (CMS/HCC)       PAST SURGICAL HISTORY     Past Surgical History:   Procedure Laterality Date   • APPENDECTOMY     • CARDIAC CATHETERIZATION     • NEPHRECTOMY     • TENDON REPAIR        SOCIAL HISTORY     Social History     Socioeconomic History   • Marital status:      Spouse name: Not on file   • Number of children: Not on file   • Years of education: Not on file   • Highest education level: Not on file   Tobacco Use   • Smoking status: Never Smoker   • Smokeless tobacco: Never Used   Substance and Sexual Activity   • Alcohol use: Yes     Comment: occasional   • Drug use: No   • Sexual activity: Defer      ALLERGIES   Morphine   MEDICATIONS     Current Outpatient Medications   Medication Sig Dispense Refill   • albuterol 108 (90 Base) MCG/ACT inhaler Inhale 2 puffs Every 4 (Four) Hours As Needed for Wheezing. 1 inhaler 11   • aspirin 81 MG EC tablet Take 81 mg by mouth Daily.     • atorvastatin (LIPITOR) 20 MG tablet Take 20 mg by mouth Every Night.     • azithromycin (ZITHROMAX) 250 MG tablet Take 2 tablets the first day, then 1 tablet daily for 4 days. 6 tablet 0   • isosorbide mononitrate (IMDUR) 30 MG 24 hr tablet Take 30 mg by mouth Daily.     • lisinopril-hydrochlorothiazide (PRINZIDE,ZESTORETIC) 20-12.5 MG per tablet Take 1 tablet by mouth Daily.     • metoprolol succinate XL (TOPROL-XL) 25 MG 24 hr tablet Take 25 mg by mouth Daily.     • predniSONE (DELTASONE) 20 MG tablet 40mg qd 5 days. 10 tablet 0   • traZODone (DESYREL) 50 MG tablet Take 1-3 tablets by mouth Every Night. 90 tablet 11   • cyproheptadine 2 MG/5ML syrup Take 5-10 mL by mouth Every 8 (Eight) Hours.  240 mL 12     No current facility-administered medications for this visit.         The following portions of the patient's history were reviewed and updated as appropriate: allergies, current medications, past family history, past medical history, past social history, past surgical history and problem list.        Assessment/Plan   Jimmie was seen today for follow-up.    Diagnoses and all orders for this visit:    Bronchitis  -     XR Chest PA & Lateral    Other orders  -     cyproheptadine 2 MG/5ML syrup; Take 5-10 mL by mouth Every 8 (Eight) Hours.      Xray nothing acute  Periactin to dry up secretions.  Warned drowsiness.                   No Follow-up on file.                  This document has been electronically signed by Jimmie Mathew MD on December 11, 2018 11:41 AM

## 2018-12-26 ENCOUNTER — OFFICE VISIT (OUTPATIENT)
Dept: FAMILY MEDICINE CLINIC | Facility: CLINIC | Age: 63
End: 2018-12-26

## 2018-12-26 VITALS
WEIGHT: 230 LBS | BODY MASS INDEX: 36.1 KG/M2 | OXYGEN SATURATION: 96 % | HEIGHT: 67 IN | TEMPERATURE: 97.2 F | DIASTOLIC BLOOD PRESSURE: 70 MMHG | HEART RATE: 89 BPM | SYSTOLIC BLOOD PRESSURE: 130 MMHG

## 2018-12-26 DIAGNOSIS — M79.645 THUMB PAIN, LEFT: ICD-10-CM

## 2018-12-26 DIAGNOSIS — M25.532 LEFT WRIST PAIN: Primary | ICD-10-CM

## 2018-12-26 PROCEDURE — 99213 OFFICE O/P EST LOW 20 MIN: CPT | Performed by: FAMILY MEDICINE

## 2018-12-26 NOTE — PROGRESS NOTES
" Subjective   Jimmie Ortega is a 63 y.o. male.     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  Hurt left thumb, left wrist already injured and was first day he stopped wearing his brace.  Contusion right buttock  Left wrist was to be xray'd again to see how well healing.    Review of Systems   Constitutional: Negative for chills, fatigue and fever.   HENT: Negative for congestion, ear discharge, ear pain, facial swelling, hearing loss, postnasal drip, rhinorrhea, sinus pressure, sore throat, trouble swallowing and voice change.    Eyes: Negative for discharge, redness and visual disturbance.   Respiratory: Negative for cough, chest tightness, shortness of breath and wheezing.    Cardiovascular: Negative for chest pain and palpitations.   Gastrointestinal: Negative for abdominal pain, blood in stool, constipation, diarrhea, nausea and vomiting.   Endocrine: Negative for polydipsia and polyuria.   Genitourinary: Negative for dysuria, flank pain, hematuria and urgency.   Musculoskeletal: Negative for arthralgias, back pain, joint swelling and myalgias.   Skin: Negative for rash.   Neurological: Negative for dizziness, weakness, numbness and headaches.   Hematological: Negative for adenopathy.   Psychiatric/Behavioral: Negative for confusion and sleep disturbance. The patient is not nervous/anxious.            /70 (BP Location: Right arm, Patient Position: Sitting, Cuff Size: Adult)   Pulse 89   Temp 97.2 °F (36.2 °C) (Temporal)   Ht 170.2 cm (67.01\")   Wt 104 kg (230 lb)   SpO2 96%   BMI 36.01 kg/m²       Objective     Physical Exam   Constitutional: He is oriented to person, place, and time. He appears well-developed and well-nourished.   HENT:   Head: Normocephalic and atraumatic.   Right Ear: External ear normal.   Left Ear: External ear normal.   Nose: Nose normal.   Eyes: Conjunctivae and EOM are normal. Pupils are equal, round, and reactive to " light.   Neck: Normal range of motion.   Pulmonary/Chest: Effort normal.   Musculoskeletal: Normal range of motion.   Neurological: He is alert and oriented to person, place, and time.   Skin:   6cm contusion right upper buttock   Psychiatric: He has a normal mood and affect. His behavior is normal. Judgment and thought content normal.   Nursing note and vitals reviewed.          PAST MEDICAL HISTORY     Past Medical History:   Diagnosis Date   • Cardiac disease    • Cerebral palsy (CMS/HCC)    • Renal cancer (CMS/HCC)       PAST SURGICAL HISTORY     Past Surgical History:   Procedure Laterality Date   • APPENDECTOMY     • CARDIAC CATHETERIZATION     • NEPHRECTOMY     • TENDON REPAIR        SOCIAL HISTORY     Social History     Socioeconomic History   • Marital status:      Spouse name: Not on file   • Number of children: Not on file   • Years of education: Not on file   • Highest education level: Not on file   Tobacco Use   • Smoking status: Never Smoker   • Smokeless tobacco: Never Used   Substance and Sexual Activity   • Alcohol use: Yes     Comment: occasional   • Drug use: No   • Sexual activity: Defer      ALLERGIES   Morphine   MEDICATIONS     Current Outpatient Medications   Medication Sig Dispense Refill   • albuterol 108 (90 Base) MCG/ACT inhaler Inhale 2 puffs Every 4 (Four) Hours As Needed for Wheezing. 1 inhaler 11   • aspirin 81 MG EC tablet Take 81 mg by mouth Daily.     • atorvastatin (LIPITOR) 20 MG tablet Take 20 mg by mouth Every Night.     • isosorbide mononitrate (IMDUR) 30 MG 24 hr tablet Take 30 mg by mouth Daily.     • lisinopril-hydrochlorothiazide (PRINZIDE,ZESTORETIC) 20-12.5 MG per tablet Take 1 tablet by mouth Daily.     • metoprolol succinate XL (TOPROL-XL) 25 MG 24 hr tablet Take 25 mg by mouth Daily.     • traZODone (DESYREL) 50 MG tablet Take 1-3 tablets by mouth Every Night. 90 tablet 11   • cyproheptadine 2 MG/5ML syrup Take 5-10 mL by mouth Every 8 (Eight) Hours. 240 mL 12      No current facility-administered medications for this visit.         The following portions of the patient's history were reviewed and updated as appropriate: allergies, current medications, past family history, past medical history, past social history, past surgical history and problem list.        Assessment/Plan   Jimmie was seen today for fall and bleeding/bruising.    Diagnoses and all orders for this visit:    Left wrist pain  -     XR Wrist 3+ View Left; Future    Thumb pain, left  -     XR Hand 3+ View Left; Future        Return for xray when available             No Follow-up on file.                  This document has been electronically signed by Jimmie Mathew MD on December 26, 2018 5:36 PM

## 2019-01-07 ENCOUNTER — LAB (OUTPATIENT)
Dept: LAB | Facility: HOSPITAL | Age: 64
End: 2019-01-07

## 2019-01-07 ENCOUNTER — TRANSCRIBE ORDERS (OUTPATIENT)
Dept: LAB | Facility: HOSPITAL | Age: 64
End: 2019-01-07

## 2019-01-07 DIAGNOSIS — I10 BENIGN HYPERTENSION: ICD-10-CM

## 2019-01-07 DIAGNOSIS — I10 BENIGN HYPERTENSION: Primary | ICD-10-CM

## 2019-01-07 PROCEDURE — 80069 RENAL FUNCTION PANEL: CPT | Performed by: INTERNAL MEDICINE

## 2019-01-08 LAB
ALBUMIN SERPL-MCNC: 4.3 G/DL (ref 3.4–4.8)
ANION GAP SERPL CALCULATED.3IONS-SCNC: 13 MMOL/L (ref 5–15)
BUN BLD-MCNC: 24 MG/DL (ref 7–21)
BUN/CREAT SERPL: 15.7 (ref 7–25)
CALCIUM SPEC-SCNC: 9.5 MG/DL (ref 8.4–10.2)
CHLORIDE SERPL-SCNC: 100 MMOL/L (ref 95–110)
CO2 SERPL-SCNC: 21 MMOL/L (ref 22–31)
CREAT BLD-MCNC: 1.53 MG/DL (ref 0.7–1.3)
GFR SERPL CREATININE-BSD FRML MDRD: 46 ML/MIN/1.73 (ref 49–113)
GLUCOSE BLD-MCNC: 105 MG/DL (ref 60–100)
PHOSPHATE SERPL-MCNC: 3.6 MG/DL (ref 2.4–4.4)
POTASSIUM BLD-SCNC: 4.1 MMOL/L (ref 3.5–5.1)
SODIUM BLD-SCNC: 134 MMOL/L (ref 137–145)

## 2019-01-11 ENCOUNTER — HOSPITAL ENCOUNTER (OUTPATIENT)
Dept: PHYSICAL THERAPY | Facility: HOSPITAL | Age: 64
Setting detail: THERAPIES SERIES
Discharge: HOME | End: 2019-01-11

## 2019-01-11 DIAGNOSIS — M43.06 LUMBAR SPONDYLOLYSIS: Primary | ICD-10-CM

## 2019-01-11 DIAGNOSIS — M47.812 SPONDYLOSIS OF CERVICAL REGION WITHOUT MYELOPATHY OR RADICULOPATHY: ICD-10-CM

## 2019-01-11 PROCEDURE — 97162 PT EVAL MOD COMPLEX 30 MIN: CPT | Performed by: PHYSICAL THERAPIST

## 2019-01-11 PROCEDURE — 97140 MANUAL THERAPY 1/> REGIONS: CPT | Performed by: PHYSICAL THERAPIST

## 2019-01-11 NOTE — THERAPY EVALUATION
Outpatient Physical Therapy Ortho Initial Evaluation  [facility]     Patient Name: Jimmie Ortega  : 1955  MRN: [id number]  Today's Date: 2019      Visit Date: 2019      Attendance    Authorized Medicare   Pre Rx pain 3   Post Rx pain 2   % improvement 0   MD follow up 0   Recert date            Patient Active Problem List   Diagnosis   • Left wrist pain   • Closed displaced fracture of middle third of navicular bone of left wrist   • Trigger index finger of right hand   • Coronary artery disease involving native coronary artery of native heart without angina pectoris   • Mixed hyperlipidemia   • Lumbar spondylosis   • Low back pain   • Osteoarthritis of cervical spine   • Lumbar spondylolysis        Past Medical History:   Diagnosis Date   • Cardiac disease    • Cerebral palsy (CMS/HCC)    • Renal cancer (CMS/HCC)         Past Surgical History:   Procedure Laterality Date   • APPENDECTOMY     • CARDIAC CATHETERIZATION     • NEPHRECTOMY     • TENDON REPAIR     Medications (Admitted on 2019)    albuterol 108 (90 Base) MCG/ACT inhaler    aspirin 81 MG EC tablet    atorvastatin (LIPITOR) 20 MG tablet    cyproheptadine 2 MG/5ML syrup    isosorbide mononitrate (IMDUR) 30 MG 24 hr tablet    lisinopril-hydrochlorothiazide (PRINZIDE,ZESTORETIC) 20-12.5  MG per tablet    metoprolol succinate XL (TOPROL-XL) 25 MG 24 hr tablet    traZODone (DESYREL) 50 MG tablet     ALLERGIES: Morphine    Visit Dx:     ICD-10-CM ICD-9-CM   1. Lumbar spondylolysis M43.06 738.4   2. Spondylosis of cervical region without myelopathy or radiculopathy M47.812 721.0     Subjective Evaluation    History of Present Illness  Onset date: Chronic.  Mechanism of injury: Fell [place] at [employer] when the communion table gave way.Bruised hip.    Subjective comment: reports history of CP and weakness on left side.  Was moving and injured left wrist a few months ago. Chronic neck and back pain. Mobiity and pain  issues. numbness in Right hand more than left. Especially noted in recliner or if I cross my arms. LBP if up for long or standing for long periods of time, expeically on concrete floors.    Patient Occupation:  at [employer]. Quality of life: good    Pain  Current pain rating: 3 (neck 3, LBP 0)  At worst pain ratin (LBP 2-3)  Location: Right leg feels weak at times.   Quality: needle-like and radiating  Relieving factors: rest and heat (massage chair with rollers)  Aggravating factors: ambulation, lifting and standing  Progression: no change    Social Support  Lives in: multiple-level home  Lives with: spouse    Hand dominance: right    Diagnostic Tests  X-ray: abnormal  Abnormal MRI: not recent.    Patient Goals  Patient goals for therapy: decreased pain and increased motion      Objective    Patient presents with slightly rounded shoulder posture.  He has a brace on his left wrist due to previous injury.  He reports he has multiple limitations in use of the left upper extremity due to his cerebral palsy as well as lower extremity mobility and strength.   Cervical range of motion 30° extension, 35° flexion, right rotation 52°/left 55°: Cervical lateral flexion right 22°/left 25°.  Trigger point left upper trap greater than right   lumbar range of motion 18 inches from the floor for flexion, extension 20°, lateral flexion and rotation are within functional limits.   Right shoulder manual muscle testing flexion 4, abduction 4, external rotation 5, internal rotation 5.  Left shoulder flexion and abduction 5/5 with remaining distal  strength.  Wrist brace deferred due to prior unrelated injury.  Patient has 5/5 quadriceps and hamstrings on the right with a -30° extension of the left quad and hamstring 4.  Dorsiflexion 5/5 on the right with inability to actively dorsiflex the left ankle.  Special tests negative Spurling's, negative cervical compression and distraction, negative vertebral artery.   Negative straight leg raise  : positive hamstring tightness bilaterally.    Treatment: Cervical manual therapy with distraction, glides and passive intervertebral movement, myofascial release upper traps             Assessment/Plan   Patient would benefit from cervical range of motion and stretching, right upper extremity strengthening may limit paresthesia activities as well.  General core stabilization for lumbar spondylolysis and right lower extremity weakness and lack of endurance during gait.    Follow-up 2 times a week for approximately 4 weeks with reassessment done at 3 weeks.  Plan:  Manual therapy cervical spine.  Postural scapular and lumbar stabilization strengthening activities.  Flexibility of hamstrings and hip internal/external rotators    Short-term goals:  1.  Patient independent home exercise program.  2.  Patient will have cervical rotation to 60° bilaterally  3   patient to have lateral cervical flexion 30° bilaterally.  4.  Patient have lumbar flexion to within 12 inches of the floor.  5.  Patient will tolerate 45 minutes of exercise without increased paresthesias of the upper and lower extremity.         Long-term goals:  1.  Patient to be able to report 60-70% overall improvement.  2.  Patient to tolerate walking 15 minutes without increased symptoms through grocery shopping activities  3.  Patient to manual muscle test Right shoulder flexion and abduction at 4+/5  4.  Patient will have NDI score 10% or less       Outcome Measure Options: Neck Disability Index (NDI)  Neck Disability Index  Section 1 - Pain Intensity: The pain is very mild at the moment.  Section 2 - Personal Care: I can look after myself normally without causing extra pain.  Section 3 - Lifting: Pain prevents me from lifting heavy weights, but I can manage light weights if they are conveniently positioned.  Section 4 - Work: I can do as much work as I want.  Section 5 - Headaches: I have slight headaches that come  infrequently.  Section 6 - Concentration: I can concentrate fully without difficulty.  Section 7 - Sleeping: My sleep is slightly disturbed for less than 1 hour.  Section 8 - Driving: I can drive my car without neck pain  Section 9 - Reading: I can read as much as I want with slight neck pain.  Section 10 - Recreation: I have some neck pain with all recreational activities.  Neck Disability Index Score: 8      Time Calculation:     Therapy Suggested Charges     Code   Minutes Charges    None             Start Time: 0922  Stop Time: 1010  Time Calculation (min): 48 min  Total Timed Code Minutes- PT: 12 minute(s)     Therapy Charges for Today     Code Description Service Date Service Provider Modifiers Qty    77646089120 HC PT EVAL MOD COMPLEXITY 2 1/11/2019 Jessica Frye, PT DPT GP 1    89770873861 HC PT MANUAL THERAPY EA 15 MIN 1/11/2019 Jessica Frye, PT DPT GP 1          PT G-Codes  Outcome Measure Options: Neck Disability Index (NDI)  Neck Disability Index Score: 8         Jessica Frye PT DPT  1/11/2019

## 2019-01-14 ENCOUNTER — HOSPITAL ENCOUNTER (OUTPATIENT)
Dept: PHYSICAL THERAPY | Facility: HOSPITAL | Age: 64
Setting detail: THERAPIES SERIES
Discharge: HOME | End: 2019-01-14

## 2019-01-14 DIAGNOSIS — M47.812 SPONDYLOSIS OF CERVICAL REGION WITHOUT MYELOPATHY OR RADICULOPATHY: ICD-10-CM

## 2019-01-14 DIAGNOSIS — M43.06 LUMBAR SPONDYLOLYSIS: Primary | ICD-10-CM

## 2019-01-14 PROCEDURE — 97110 THERAPEUTIC EXERCISES: CPT

## 2019-01-14 PROCEDURE — 97140 MANUAL THERAPY 1/> REGIONS: CPT

## 2019-01-15 NOTE — PROGRESS NOTES
Outpatient Physical Therapy Ortho Treatment Note   [facility]     Patient Name: Jimmie Ortega  : 1955  MRN: [id number]  Today's Date: 2019      Visit Date: 2019    Subjective Improvement:  NA     Attendance:   Approved:     Per Medicare      MD follow up:     ?       date:    19     Visit Dx:    ICD-10-CM ICD-9-CM   1. Lumbar spondylolysis M43.06 738.4   2. Spondylosis of cervical region without myelopathy or radiculopathy M47.812 721.0       Patient Active Problem List   Diagnosis   • Left wrist pain   • Closed displaced fracture of middle third of navicular bone of left wrist   • Trigger index finger of right hand   • Coronary artery disease involving native coronary artery of native heart without angina pectoris   • Mixed hyperlipidemia   • Lumbar spondylosis   • Low back pain   • Osteoarthritis of cervical spine   • Lumbar spondylolysis        Past Medical History:   Diagnosis Date   • Cardiac disease    • Cerebral palsy (CMS/HCC)    • Renal cancer (CMS/HCC)         Past Surgical History:   Procedure Laterality Date   • APPENDECTOMY     • CARDIAC CATHETERIZATION     • NEPHRECTOMY     • TENDON REPAIR         PT Ortho     Row Name 19 1600       Subjective Comments    Subjective Comments  Pt reports his back is doing better.  Wants to focus on his neck with therapy.     -TM       Precautions and Contraindications    Precautions  lumbar and cervical spondylosis  -TM       Subjective Pain    Able to rate subjective pain?  yes  -TM    Pre-Treatment Pain Level  3  -TM    Post-Treatment Pain Level  3  -TM    Subjective Pain Comment  neck  -TM       Posture/Observations    Posture/Observations Comments  rounded shoulders/forward head  -TM      User Key  (r) = Recorded By, (t) = Taken By, (c) = Cosigned By    Initials Name Provider Type    TM Rowan Hdz, PTA Physical Therapy Assistant                      PT Assessment/Plan     Row Name 19 4160        "   PT Assessment    Assessment Comments  Pt able to tolerate all stretches and initial exercises well.  No change in pain post RX.  Reports that manual distraction of C spine felt good and is interested in mechanical traction.  -TM     Patient/caregiver participated in establishment of treatment plan and goals  Yes  -TM        PT Plan    PT Frequency  2x/week  -TM     Predicted Duration of Therapy Intervention (Therapy Eval)  4 weeks  -TM     PT Plan Comments  Cont manual RX.  Discuss possibility of mechanical traction with PT.    -TM       User Key  (r) = Recorded By, (t) = Taken By, (c) = Cosigned By    Initials Name Provider Type    Rowan Armando PTA Physical Therapy Assistant          Modalities     Row Name 01/14/19 1600             Ice    Patient reports will apply ice at home to involved area  Yes  -TM        User Key  (r) = Recorded By, (t) = Taken By, (c) = Cosigned By    Initials Name Provider Type    Rowan Armando PTA Physical Therapy Assistant          Exercises     Row Name 01/14/19 1600             Subjective Comments    Subjective Comments  Pt reports his back is doing better.  Wants to focus on his neck with therapy.     -TM         Subjective Pain    Able to rate subjective pain?  yes  -TM      Pre-Treatment Pain Level  3  -TM      Post-Treatment Pain Level  3  -TM      Subjective Pain Comment  neck  -TM         Exercise 1    Exercise Name 1  UT stretch  -TM      Reps 1  2  -TM      Time 1  30\"  -TM         Exercise 2    Exercise Name 2  levator stretch  -TM      Reps 2  2  -TM      Time 2  30\"  -TM         Exercise 3    Exercise Name 3  CS AROM rotation  -TM      Reps 3  10  -TM         Exercise 4    Exercise Name 4  chin tucks  -TM      Sets 4  2  -TM      Reps 4  10  -TM         Exercise 5    Exercise Name 5  scap squeezes  -TM      Sets 5  2  -TM      Reps 5  10  -TM      Time 5  5\" hold  -TM         Exercise 6    Exercise Name 6  seated HS stretch  -TM      Reps 6  3  -TM   " "   Time 6  30\"  -TM      Additional Comments  bilateral  -TM        User Key  (r) = Recorded By, (t) = Taken By, (c) = Cosigned By    Initials Name Provider Type    TM Rowan Hdz PTA Physical Therapy Assistant                        Manual Rx (last 36 hours)      Manual Treatments     Row Name 01/14/19 1700             Manual Rx 1    Manual Rx 1 Location  cervical   -TM      Manual Rx 1 Type  distraction  -TM      Manual Rx 1 Duration  3 min  -TM         Manual Rx 2    Manual Rx 2 Location  suboccipital   -TM      Manual Rx 2 Type  release  -TM      Manual Rx 2 Duration  2 min  -TM         Manual Rx 3    Manual Rx 3 Location  cervical  -TM      Manual Rx 3 Type  PA glides  -TM      Manual Rx 3 Duration  2 min  -TM         Manual Rx 4    Manual Rx 4 Location  bilateral UT  -TM      Manual Rx 4 Type  MFR  -TM      Manual Rx 4 Duration  5 min  -TM        User Key  (r) = Recorded By, (t) = Taken By, (c) = Cosigned By    Initials Name Provider Type     Rowan Hdz PTA Physical Therapy Assistant          PT OP Goals     Row Name 01/14/19 1700          PT Short Term Goals    STG 1  Patient independent in home exercise program  -TM     STG 1 Progress  Ongoing  -TM     STG 2  Patient will have cervical rotation 60° bilaterally  -TM     STG 2 Progress  Ongoing  -TM     STG 3  Patient will have cervical lateral flexion 30° bilaterally  -TM     STG 3 Progress  Ongoing  -TM     STG 4  Patient have lumbar flexion within 12 inches of floor  -TM     STG 4 Progress  Ongoing  -TM     STG 5  Patient tolerate 45 minutes exercise without increased paresthesias of upper or lower extremities  -TM     STG 5 Progress  Ongoing  -TM        Long Term Goals    LTG 1  Patient will report 60-70% overall improvement  -TM     LTG 1 Progress  Ongoing  -TM     LTG 2  Patient tolerate walking 15 minutes without increased symptoms through grocery shopping activities  -TM     LTG 2 Progress  Ongoing  -TM     LTG 3  Patient's manual " muscle test shoulder flexion and abduction on the right 4+/5  -TM     LTG 3 Progress  Ongoing  -TM     LTG 4  Patient will have NDI score of 10% or less  -     LTG 4 Progress  Ongoing  -TM       User Key  (r) = Recorded By, (t) = Taken By, (c) = Cosigned By    Initials Name Provider Type    TM Rowan Hdz PTA Physical Therapy Assistant          Therapy Education  Education Details: HEP: UT, Levator stretch; ROM, chin tucks, scap   Given: HEP  Program: New  How Provided: Demonstration, Written  Provided to: Patient  Level of Understanding: Teach back education performed              Time Calculation:   Start Time: 1645  Stop Time: 1740  Time Calculation (min): 55 min  Total Timed Code Minutes- PT: 55 minute(s)  Therapy Suggested Charges     Code   Minutes Charges    None           Therapy Charges for Today     Code Description Service Date Service Provider Modifiers Qty    98326934040 HC PT THER PROC EA 15 MIN 1/14/2019 Rowan Hdz PTA GP 3    00513335859 HC PT MANUAL THERAPY EA 15 MIN 1/14/2019 Rowan Hdz PTA GP 1                    Rowan Hdz PTA  1/14/2019

## 2019-01-17 ENCOUNTER — HOSPITAL ENCOUNTER (OUTPATIENT)
Dept: PHYSICAL THERAPY | Facility: HOSPITAL | Age: 64
Setting detail: THERAPIES SERIES
Discharge: HOME | End: 2019-01-17

## 2019-01-17 DIAGNOSIS — M43.06 LUMBAR SPONDYLOLYSIS: Primary | ICD-10-CM

## 2019-01-17 DIAGNOSIS — M47.812 SPONDYLOSIS OF CERVICAL REGION WITHOUT MYELOPATHY OR RADICULOPATHY: ICD-10-CM

## 2019-01-17 PROCEDURE — 97110 THERAPEUTIC EXERCISES: CPT | Performed by: PHYSICAL THERAPIST

## 2019-01-17 PROCEDURE — 97012 MECHANICAL TRACTION THERAPY: CPT | Performed by: PHYSICAL THERAPIST

## 2019-01-17 PROCEDURE — 97140 MANUAL THERAPY 1/> REGIONS: CPT | Performed by: PHYSICAL THERAPIST

## 2019-01-17 NOTE — THERAPY TREATMENT NOTE
Outpatient Physical Therapy Ortho Treatment Note  [facility]     Patient Name: Jimmie Ortega  : 1955  MRN: [id number]  Today's Date: 2019      Visit Date: 2019      Attendance 3/3   Authorized Medicare   Pre Rx pain 3   Post Rx pain 2   % improvement 0   MD follow up ?   Recert date            Visit Dx:    ICD-10-CM ICD-9-CM   1. Lumbar spondylolysis M43.06 738.4   2. Spondylosis of cervical region without myelopathy or radiculopathy M47.812 721.0       Patient Active Problem List   Diagnosis   • Left wrist pain   • Closed displaced fracture of middle third of navicular bone of left wrist   • Trigger index finger of right hand   • Coronary artery disease involving native coronary artery of native heart without angina pectoris   • Mixed hyperlipidemia   • Lumbar spondylosis   • Low back pain   • Osteoarthritis of cervical spine   • Lumbar spondylolysis        Past Medical History:   Diagnosis Date   • Cardiac disease    • Cerebral palsy (CMS/HCC)    • Renal cancer (CMS/HCC)         Past Surgical History:   Procedure Laterality Date   • APPENDECTOMY     • CARDIAC CATHETERIZATION     • NEPHRECTOMY     • TENDON REPAIR     OBJECTIVE:  Presents in left wrist splint. Some TTP cervical paraspinals        PT Assessment/Plan     Row Name 19 1641          PT Assessment    Rehab Potential  Good  -DD     Patient/caregiver participated in establishment of treatment plan and goals  Yes  -DD     Patient would benefit from skilled therapy intervention  Yes  -DD        PT Plan    PT Frequency  2x/week  -DD     Predicted Duration of Therapy Intervention (Therapy Eval)  4 weeks  -DD     PT Plan Comments  scapular and core strengthening. Neural tension stretches. Manual and traction.  -DD       User Key  (r) = Recorded By, (t) = Taken By, (c) = Cosigned By    Initials Name Provider Type    Jessica Nix, PT DPT Physical Therapist          Modalities     Row Name 19 1300        "      Ice    Patient reports will apply ice at home to involved area  Yes  -DD         Traction 98584    Traction Type  Cervical  -DD      Rx Minutes  15  -DD      Position  Hook-lying  -DD      Weight  30  -DD      Hold  60  -DD      Relax  10  -DD      Progression  1  -DD      Regression  1  -DD        User Key  (r) = Recorded By, (t) = Taken By, (c) = Cosigned By    Initials Name Provider Type    Jessica Nix, PT DPT Physical Therapist          Exercises     Row Name 01/17/19 1400             Exercise 1    Exercise Name 1  Pro 2   -DD      Time 1  5'  -DD      Additional Comments  L2 no Left UE  -DD         Exercise 2    Exercise Name 2  levator stretch  -DD      Reps 2  2  -DD      Time 2  30\"  -DD         Exercise 3    Exercise Name 3  UT stretch  -DD      Reps 3  2/30\"  -DD         Exercise 4    Exercise Name 4  PEc stretch  -DD      Sets 4  2  -DD      Reps 4  2/30\"  -DD         Exercise 5    Exercise Name 5  bicep stretch  -DD      Reps 5  2/30\"  -DD         Exercise 6    Exercise Name 6  rows  on ball  -DD      Reps 6  20  -DD      Additional Comments  green  -DD         Exercise 7    Exercise Name 7  clocks on ball  -DD        User Key  (r) = Recorded By, (t) = Taken By, (c) = Cosigned By    Initials Name Provider Type    Jessica Nix, PT DPT Physical Therapist                        Manual Rx (last 36 hours)      Manual Treatments     Row Name 01/17/19 1430             Manual Rx 1    Manual Rx 1 Location  cervical   -DD      Manual Rx 1 Type  distraction  -DD      Manual Rx 1 Duration  3 min  -DD         Manual Rx 2    Manual Rx 2 Location  suboccipital   -DD      Manual Rx 2 Type  release  -DD      Manual Rx 2 Duration  4 min  -DD         Manual Rx 3    Manual Rx 3 Location  cervical  -DD      Manual Rx 3 Type  PA glides/ all planes  -DD      Manual Rx 3 Duration  4 min  -DD         Manual Rx 4    Manual Rx 4 Location  bilateral UT  -DD      Manual Rx 4 Type  MFR  -DD      " Manual Rx 4 Duration  5 min  -DD        User Key  (r) = Recorded By, (t) = Taken By, (c) = Cosigned By    Initials Name Provider Type    Jessica Nix, PT DPT Physical Therapist          PT OP Goals     Row Name 01/17/19 1641 01/17/19 1400       PT Short Term Goals    STG 1  --  Patient independent in home exercise program  -DD    STG 1 Progress  --  Ongoing  -DD    STG 2  --  Patient will have cervical rotation 60° bilaterally  -DD    STG 2 Progress  --  Ongoing  -DD    STG 3  --  Patient will have cervical lateral flexion 30° bilaterally  -DD    STG 3 Progress  --  Ongoing  -DD    STG 4  --  Patient have lumbar flexion within 12 inches of floor  -DD    STG 4 Progress  --  Ongoing  -DD    STG 5  --  Patient tolerate 45 minutes exercise without increased paresthesias of upper or lower extremities  -DD    STG 5 Progress  --  Ongoing  -DD       Long Term Goals    LTG 1  --  Patient will report 60-70% overall improvement  -DD    LTG 1 Progress  --  Ongoing  -DD    LTG 2  --  Patient tolerate walking 15 minutes without increased symptoms through grocery shopping activities  -DD    LTG 2 Progress  --  Ongoing  -DD    LTG 3  --  Patient's manual muscle test shoulder flexion and abduction on the right 4+/5  -DD    LTG 3 Progress  --  Ongoing  -DD    LTG 4  --  Patient will have NDI score of 10% or less  -DD    LTG 4 Progress  --  Ongoing  -DD       Time Calculation    PT Goal Re-Cert Due Date  01/31/19  -DD  --      User Key  (r) = Recorded By, (t) = Taken By, (c) = Cosigned By    Initials Name Provider Type    Jessica Nix, PT DPT Physical Therapist                         Time Calculation:   Start Time: 1430  Stop Time: 1527  Time Calculation (min): 57 min  Total Timed Code Minutes- PT: 42 minute(s)  Therapy Suggested Charges     Code   Minutes Charges    None           Therapy Charges for Today     Code Description Service Date Service Provider Modifiers Qty    01597214852  PT-TRACTION  MECHANICAL 1/17/2019 Jessica Frye, PT DPT  1    33638289216 HC PT MANUAL THERAPY EA 15 MIN 1/17/2019 Jessica Frye, PT DPT GP 1    75562109568 HC PT THER PROC EA 15 MIN 1/17/2019 Jessica Frye, PT DPT GP 2                    Jessica BEE. Uday, PT DPT  1/17/2019

## 2019-01-21 ENCOUNTER — HOSPITAL ENCOUNTER (OUTPATIENT)
Dept: PHYSICAL THERAPY | Facility: HOSPITAL | Age: 64
Setting detail: THERAPIES SERIES
Discharge: HOME | End: 2019-01-21

## 2019-01-21 DIAGNOSIS — M43.06 LUMBAR SPONDYLOLYSIS: Primary | ICD-10-CM

## 2019-01-21 PROCEDURE — 97012 MECHANICAL TRACTION THERAPY: CPT

## 2019-01-21 PROCEDURE — 97110 THERAPEUTIC EXERCISES: CPT

## 2019-01-21 NOTE — PROGRESS NOTES
Outpatient Physical Therapy Ortho Treatment Note   David Azar     Patient Name: Jimmie Ortega  : 1955  MRN: [id number]  Today's Date: 2019      Visit Date: 2019    Subjective Improvement:     60%  Attendance:   Approved:     Per Medicare MD follow up:     PRN       date:    19    Visit Dx:    ICD-10-CM ICD-9-CM   1. Lumbar spondylolysis M43.06 738.4       Patient Active Problem List   Diagnosis   • Left wrist pain   • Closed displaced fracture of middle third of navicular bone of left wrist   • Trigger index finger of right hand   • Coronary artery disease involving native coronary artery of native heart without angina pectoris   • Mixed hyperlipidemia   • Lumbar spondylosis   • Low back pain   • Osteoarthritis of cervical spine   • Lumbar spondylolysis        Past Medical History:   Diagnosis Date   • Cardiac disease    • Cerebral palsy (CMS/HCC)    • Renal cancer (CMS/HCC)         Past Surgical History:   Procedure Laterality Date   • APPENDECTOMY     • CARDIAC CATHETERIZATION     • NEPHRECTOMY     • TENDON REPAIR         PT Ortho     Row Name 19 1500       Subjective Comments    Subjective Comments  Pt reports the mechanical traction was great.  His low back also feels better.    -TM       Precautions and Contraindications    Precautions  lumbar and cervical spondylosis  -TM       Subjective Pain    Able to rate subjective pain?  yes  -TM    Pre-Treatment Pain Level  2  -TM      User Key  (r) = Recorded By, (t) = Taken By, (c) = Cosigned By    Initials Name Provider Type    Rowan Armando, PTA Physical Therapy Assistant                      PT Assessment/Plan     Row Name 19 1500          PT Assessment    Assessment Comments  Pt does well with physioball core stab.  Is challenged with scap exercises due to limitations of L wrist.  Good response to mechanical traction.    -TM     Patient/caregiver participated in establishment of treatment  "plan and goals  Yes  -TM        PT Plan    PT Frequency  2x/week  -TM     Predicted Duration of Therapy Intervention (Therapy Eval)  4 weeks  -TM     PT Plan Comments  Try supine serratus punches & CW/CCW circles at 90°   -TM       User Key  (r) = Recorded By, (t) = Taken By, (c) = Cosigned By    Initials Name Provider Type    TM Rowan Hdz, TANVIR Physical Therapy Assistant          Modalities     Row Name 01/21/19 1500             Moist Heat    MH Applied  Yes  -TM      Location  cervical concurrent with traction  -TM      Rx Minutes  15 mins  -TM         Traction 63986    Traction Type  Cervical  -TM      Rx Minutes  15  -TM      Position  Hook-lying  -TM      Weight  30  -TM      Hold  60  -TM      Relax  10  -TM      Progression  1  -TM      Regression  1  -TM        User Key  (r) = Recorded By, (t) = Taken By, (c) = Cosigned By    Initials Name Provider Type    TM Rowan Hdz, TANVIR Physical Therapy Assistant          Exercises     Row Name 01/21/19 1500             Subjective Comments    Subjective Comments  Pt reports the mechanical traction was great.  His low back also feels better.    -TM         Subjective Pain    Able to rate subjective pain?  yes  -TM      Pre-Treatment Pain Level  2  -TM         Exercise 1    Exercise Name 1  UT stretch  -TM      Reps 1  2  -TM      Time 1  30\"  -TM      Additional Comments  bilateral  -TM         Exercise 2    Exercise Name 2  levator stretch  -TM      Time 2  30\"  -TM         Exercise 3    Exercise Name 3  pec doorway stretch  -TM      Reps 3  2  -TM      Time 3  30\"  -TM         Exercise 4    Exercise Name 4  swim glides  -TM      Reps 4  10  -TM         Exercise 5    Exercise Name 5  bicep doorway stretch  -TM      Reps 5  2  -TM      Time 5  30\"  -TM         Exercise 6    Exercise Name 6  physioball PN T Band rows  -TM      Reps 6  20  -TM      Additional Comments  green  -TM         Exercise 7    Exercise Name 7  physioball PN T Band ext  -TM      " Reps 7  20  -TM      Additional Comments  green  -TM         Exercise 8    Exercise Name 8  Physioball PN T Band horiz Abd  -TM      Reps 8  20  -TM      Additional Comments  red  -TM        User Key  (r) = Recorded By, (t) = Taken By, (c) = Cosigned By    Initials Name Provider Type    TM Rowan Hdz PTA Physical Therapy Assistant                         PT OP Goals     Row Name 01/21/19 1500          PT Short Term Goals    STG 1  Patient independent in home exercise program  -TM     STG 1 Progress  Ongoing  -TM     STG 2  Patient will have cervical rotation 60° bilaterally  -TM     STG 2 Progress  Ongoing  -TM     STG 3  Patient will have cervical lateral flexion 30° bilaterally  -TM     STG 3 Progress  Ongoing  -TM     STG 4  Patient have lumbar flexion within 12 inches of floor  -TM     STG 4 Progress  Ongoing  -TM     STG 5  Patient tolerate 45 minutes exercise without increased paresthesias of upper or lower extremities  -TM     STG 5 Progress  Ongoing  -TM        Long Term Goals    LTG 1  Patient will report 60-70% overall improvement  -TM     LTG 1 Progress  Ongoing  -TM     LTG 2  Patient tolerate walking 15 minutes without increased symptoms through grocery shopping activities  -TM     LTG 2 Progress  Ongoing  -TM     LTG 3  Patient's manual muscle test shoulder flexion and abduction on the right 4+/5  -TM     LTG 3 Progress  Ongoing  -TM     LTG 4  Patient will have NDI score of 10% or less  -TM     LTG 4 Progress  Ongoing  -TM       User Key  (r) = Recorded By, (t) = Taken By, (c) = Cosigned By    Initials Name Provider Type    TM Rowan Hdz, TANVIR Physical Therapy Assistant                         Time Calculation:   Start Time: 1515  Stop Time: 1605  Time Calculation (min): 50 min  Total Timed Code Minutes- PT: 35 minute(s)  Therapy Suggested Charges     Code   Minutes Charges    None           Therapy Charges for Today     Code Description Service Date Service Provider Modifiers Qty     18343695075 HC PT THER PROC EA 15 MIN 1/21/2019 Rowan Hdz, PTA GP 2    68976370724 HC PT TRACTION CERVICAL 1/21/2019 Rowan Hdz, PTA GP 1    14216225047 HC PT THER SUPP EA 15 MIN 1/21/2019 Rowan Hdz, PTA GP 1                    Rowan Hdz, PTA  1/21/2019

## 2019-01-24 ENCOUNTER — HOSPITAL ENCOUNTER (OUTPATIENT)
Dept: PHYSICAL THERAPY | Facility: HOSPITAL | Age: 64
Setting detail: THERAPIES SERIES
Discharge: HOME | End: 2019-01-24

## 2019-01-24 DIAGNOSIS — M43.06 LUMBAR SPONDYLOLYSIS: Primary | ICD-10-CM

## 2019-01-24 PROCEDURE — 97110 THERAPEUTIC EXERCISES: CPT

## 2019-01-24 PROCEDURE — 97012 MECHANICAL TRACTION THERAPY: CPT

## 2019-01-24 PROCEDURE — 97140 MANUAL THERAPY 1/> REGIONS: CPT

## 2019-01-24 NOTE — PROGRESS NOTES
Outpatient Physical Therapy Ortho Treatment Note   David Azar     Patient Name: Jimmie Ortega  : 1955  MRN: [id number]  Today's Date: 2019      Visit Date: 2019    Subjective Improvement:     60%  Attendance:   Approved:    Per Medicare MD follow up:   PRN         date:   19      Visit Dx:    ICD-10-CM ICD-9-CM   1. Lumbar spondylolysis M43.06 738.4       Patient Active Problem List   Diagnosis   • Left wrist pain   • Closed displaced fracture of middle third of navicular bone of left wrist   • Trigger index finger of right hand   • Coronary artery disease involving native coronary artery of native heart without angina pectoris   • Mixed hyperlipidemia   • Lumbar spondylosis   • Low back pain   • Osteoarthritis of cervical spine   • Lumbar spondylolysis        Past Medical History:   Diagnosis Date   • Cardiac disease    • Cerebral palsy (CMS/HCC)    • Renal cancer (CMS/HCC)         Past Surgical History:   Procedure Laterality Date   • APPENDECTOMY     • CARDIAC CATHETERIZATION     • NEPHRECTOMY     • TENDON REPAIR         PT Ortho     Row Name 19 1500       Subjective Comments    Subjective Comments  Pt reports that he is getting better.  Having less pain & neck stiffness.  Can turn to look over his shoulder now.    -TM       Precautions and Contraindications    Precautions  lumbar and cervical spondylosis  -TM       Subjective Pain    Able to rate subjective pain?  yes  -TM    Pre-Treatment Pain Level  2  -TM      User Key  (r) = Recorded By, (t) = Taken By, (c) = Cosigned By    Initials Name Provider Type    Rowan Armando, PTA Physical Therapy Assistant                      PT Assessment/Plan     Row Name 19 1500          PT Assessment    Assessment Comments  Pt tolerating core & scap stability exercises well.  Good relaxation of musculature post mechanical traction.    -TM     Patient/caregiver participated in establishment of  "treatment plan and goals  Yes  -TM        PT Plan    PT Frequency  2x/week  -TM     Predicted Duration of Therapy Intervention (Therapy Eval)  4 weeks  -TM     PT Plan Comments  supine serratus punches & CW/CCW circles   -TM       User Key  (r) = Recorded By, (t) = Taken By, (c) = Cosigned By    Initials Name Provider Type     Rowan Hdz PTA Physical Therapy Assistant          Modalities     Row Name 01/24/19 1500             Traction 97374    Traction Type  Cervical  -TM      Rx Minutes  15  -TM      Position  Hook-lying  -TM      Weight  30  -TM      Hold  60  -TM      Relax  10  -TM      Progression  1  -TM      Regression  1  -TM        User Key  (r) = Recorded By, (t) = Taken By, (c) = Cosigned By    Initials Name Provider Type     Rowan Hdz PTA Physical Therapy Assistant          Exercises     Row Name 01/24/19 1500             Subjective Comments    Subjective Comments  Pt reports that he is getting better.  Having less pain & neck stiffness.  Can turn to look over his shoulder now.    -TM         Subjective Pain    Able to rate subjective pain?  yes  -TM      Pre-Treatment Pain Level  2  -TM         Exercise 1    Exercise Name 1  UT stretch  -TM      Reps 1  2  -TM      Time 1  30\"  -TM      Additional Comments  bilateral  -TM         Exercise 2    Exercise Name 2  levator stretch  -TM      Reps 2  2  -TM      Time 2  30\"  -TM         Exercise 3    Exercise Name 3  pec doorway stretch  -TM      Reps 3  2  -TM      Time 3  30\"  -TM         Exercise 4    Exercise Name 4  swim glides  -TM      Reps 4  10  -TM         Exercise 5    Exercise Name 5  physioball PN T band rows  -TM      Sets 5  2  -TM      Reps 5  10  -TM      Additional Comments  green  -TM         Exercise 6    Exercise Name 6  posture re-ed at wall  -TM      Reps 6  10  -TM      Time 6  5\" hold  -TM         Exercise 7    Exercise Name 7  physioball PN T Band ext  -TM      Reps 7  20  -TM      Additional Comments  green  " -TM         Exercise 8    Exercise Name 8  Physioball PN T Band horiz Abd  -TM      Reps 8  20  -TM      Additional Comments  red  -TM         Exercise 9    Exercise Name 9  manual- see flowsheet  -TM        User Key  (r) = Recorded By, (t) = Taken By, (c) = Cosigned By    Initials Name Provider Type    TM Rowan Hdz PTA Physical Therapy Assistant                        Manual Rx (last 36 hours)      Manual Treatments     Row Name 01/24/19 1500             Manual Rx 1    Manual Rx 1 Location  suboccipital  -TM      Manual Rx 1 Type  release  -TM      Manual Rx 1 Duration  3 min  -TM         Manual Rx 2    Manual Rx 2 Location  cervical   -TM      Manual Rx 2 Type  PA glides  -TM      Manual Rx 2 Duration  4 min  -TM         Manual Rx 3    Manual Rx 3 Location  bilateral UT  -TM      Manual Rx 3 Type  MFR  -TM      Manual Rx 3 Duration  5 min  -TM        User Key  (r) = Recorded By, (t) = Taken By, (c) = Cosigned By    Initials Name Provider Type     Rowan Hdz PTA Physical Therapy Assistant          PT OP Goals     Row Name 01/24/19 1500          PT Short Term Goals    STG 1  Patient independent in home exercise program  -TM     STG 1 Progress  Ongoing  -TM     STG 2  Patient will have cervical rotation 60° bilaterally  -TM     STG 2 Progress  Ongoing  -TM     STG 3  Patient will have cervical lateral flexion 30° bilaterally  -TM     STG 3 Progress  Ongoing  -TM     STG 4  Patient have lumbar flexion within 12 inches of floor  -TM     STG 4 Progress  Ongoing  -TM     STG 5  Patient tolerate 45 minutes exercise without increased paresthesias of upper or lower extremities  -TM     STG 5 Progress  Ongoing  -TM        Long Term Goals    LTG 1  Patient will report 60-70% overall improvement  -TM     LTG 1 Progress  Ongoing  -TM     LTG 2  Patient tolerate walking 15 minutes without increased symptoms through grocery shopping activities  -TM     LTG 2 Progress  Ongoing  -TM     LTG 3  Patient's  manual muscle test shoulder flexion and abduction on the right 4+/5  -TM     LTG 3 Progress  Ongoing  -TM     LTG 4  Patient will have NDI score of 10% or less  -     LTG 4 Progress  Ongoing  -TM       User Key  (r) = Recorded By, (t) = Taken By, (c) = Cosigned By    Initials Name Provider Type    TM Rowan Hdz, TANVIR Physical Therapy Assistant                         Time Calculation:   Start Time: 1518  Stop Time: 1615  Time Calculation (min): 57 min  Total Timed Code Minutes- PT: 42 minute(s)  Therapy Suggested Charges     Code   Minutes Charges    None           Therapy Charges for Today     Code Description Service Date Service Provider Modifiers Qty    20112401680 HC PT THER PROC EA 15 MIN 1/24/2019 Rowan Hdz, PTA GP 2    80665826239 HC PT TRACTION CERVICAL 1/24/2019 Rowan Hdz, PTA GP 1    18851503517 HC PT MANUAL THERAPY EA 15 MIN 1/24/2019 Rowan Hdz, PTA GP 1                    Rowan Hdz PTA  1/24/2019

## 2019-01-28 ENCOUNTER — HOSPITAL ENCOUNTER (OUTPATIENT)
Dept: PHYSICAL THERAPY | Facility: HOSPITAL | Age: 64
Setting detail: THERAPIES SERIES
Discharge: HOME | End: 2019-01-28

## 2019-01-28 DIAGNOSIS — M47.812 SPONDYLOSIS OF CERVICAL REGION WITHOUT MYELOPATHY OR RADICULOPATHY: Primary | ICD-10-CM

## 2019-01-28 DIAGNOSIS — M25.532 LEFT WRIST PAIN: Primary | ICD-10-CM

## 2019-01-28 PROCEDURE — 97110 THERAPEUTIC EXERCISES: CPT

## 2019-01-28 PROCEDURE — 97012 MECHANICAL TRACTION THERAPY: CPT

## 2019-01-28 RX ORDER — LISINOPRIL AND HYDROCHLOROTHIAZIDE 20; 12.5 MG/1; MG/1
1 TABLET ORAL DAILY
Qty: 90 TABLET | Refills: 3 | Status: SHIPPED | OUTPATIENT
Start: 2019-01-28 | End: 2019-12-19

## 2019-01-28 NOTE — PROGRESS NOTES
Outpatient Physical Therapy Ortho Treatment Note   David Azar     Patient Name: Jimmie Ortega  : 1955  MRN: [id number]  Today's Date: 2019      Visit Date: 2019    Subjective Improvement:     60%  Attendance:   Approved:      Per Medicare MD follow up:     PRN   RC date:    19     Visit Dx:    ICD-10-CM ICD-9-CM   1. Spondylosis of cervical region without myelopathy or radiculopathy M47.812 721.0       Patient Active Problem List   Diagnosis   • Left wrist pain   • Closed displaced fracture of middle third of navicular bone of left wrist   • Trigger index finger of right hand   • Coronary artery disease involving native coronary artery of native heart without angina pectoris   • Mixed hyperlipidemia   • Lumbar spondylosis   • Low back pain   • Osteoarthritis of cervical spine   • Lumbar spondylolysis        Past Medical History:   Diagnosis Date   • Cardiac disease    • Cerebral palsy (CMS/HCC)    • Renal cancer (CMS/HCC)         Past Surgical History:   Procedure Laterality Date   • APPENDECTOMY     • CARDIAC CATHETERIZATION     • NEPHRECTOMY     • TENDON REPAIR         PT Ortho     Row Name 19 1500       Precautions and Contraindications    Precautions  lumbar and cervical spondylosis  -TM       Subjective Pain    Able to rate subjective pain?  yes  -TM    Pre-Treatment Pain Level  1  -TM    Post-Treatment Pain Level  1  -TM      User Key  (r) = Recorded By, (t) = Taken By, (c) = Cosigned By    Initials Name Provider Type    TM Rowan Hdz, PTA Physical Therapy Assistant                      PT Assessment/Plan     Row Name 19 1500          PT Assessment    Assessment Comments  Had difficulty keeping neck in place during mechanical traction today & lateral supports may have been too tight.  Progressed therex without difficulty.    -TM     Patient/caregiver participated in establishment of treatment plan and goals  Yes  -TM        PT Plan    PT  "Frequency  2x/week  -TM     Predicted Duration of Therapy Intervention (Therapy Eval)  4 weeks  -TM     PT Plan Comments  measure cervical ROM  -TM       User Key  (r) = Recorded By, (t) = Taken By, (c) = Cosigned By    Initials Name Provider Type    TM Rowan Hdz, TANVIR Physical Therapy Assistant          Modalities     Row Name 01/28/19 1500             Subjective Comments    Subjective Comments  Pt reports he's continuing to improve.   -TM         Moist Heat    MH Applied  Yes  -TM      Location  cervical during traction  -TM      Rx Minutes  15 mins  -TM         Traction 50375    Traction Type  Cervical  -TM      Rx Minutes  15  -TM      Position  Hook-lying  -TM      Weight  30  -TM      Hold  60  -TM      Relax  10  -TM      Progression  1  -TM      Regression  1  -TM        User Key  (r) = Recorded By, (t) = Taken By, (c) = Cosigned By    Initials Name Provider Type    TM Rowan Hdz PTA Physical Therapy Assistant          Exercises     Row Name 01/28/19 1500             Subjective Comments    Subjective Comments  Pt reports he's continuing to improve.   -TM         Subjective Pain    Able to rate subjective pain?  yes  -TM      Pre-Treatment Pain Level  1  -TM      Post-Treatment Pain Level  1  -TM         Exercise 1    Exercise Name 1  UT stretch  -TM      Reps 1  2  -TM      Time 1  30\"  -TM      Additional Comments  bilateral  -TM         Exercise 2    Exercise Name 2  levator stretch  -TM      Reps 2  2  -TM      Time 2  30\"  -TM         Exercise 3    Exercise Name 3  pec doorway stretch  -TM      Reps 3  2  -TM      Time 3  30\"  -TM         Exercise 4    Exercise Name 4  swim glides  -TM      Reps 4  10  -TM         Exercise 5    Exercise Name 5  posture re-ed at wall  -TM         Exercise 6    Exercise Name 6  physioball PN T Band rows  -TM      Sets 6  2  -TM      Reps 6  10  -TM      Additional Comments  green  -TM         Exercise 7    Exercise Name 7  physioball PN T Band ext  -TM   "    Sets 7  2  -TM      Reps 7  10  -TM      Additional Comments  green  -TM         Exercise 8    Exercise Name 8  no money  -TM      Sets 8  2  -TM      Reps 8  10  -TM         Exercise 9    Exercise Name 9  supine serratus punches  -TM      Sets 9  2  -TM      Reps 9  10  -TM         Exercise 10    Exercise Name 10  supine CW/CCW at 90°  -TM      Reps 10  20 each  -TM      Additional Comments  bilateral  -TM        User Key  (r) = Recorded By, (t) = Taken By, (c) = Cosigned By    Initials Name Provider Type    TM Rowan Hdz, TANVIR Physical Therapy Assistant                        Manual Rx (last 36 hours)      Manual Treatments     Row Name 01/28/19 1500             Manual Rx 1    Manual Rx 1 Location  suboccipital  -TM      Manual Rx 1 Type  release  -TM      Manual Rx 1 Duration  3 min  -TM         Manual Rx 2    Manual Rx 2 Location  cervical   -TM      Manual Rx 2 Type  PA glides  -TM      Manual Rx 2 Duration  3 min  -TM        User Key  (r) = Recorded By, (t) = Taken By, (c) = Cosigned By    Initials Name Provider Type    TM Rowan Hdz, TANVIR Physical Therapy Assistant          PT OP Goals     Row Name 01/28/19 1500          PT Short Term Goals    STG 1  Patient independent in home exercise program  -TM     STG 1 Progress  Ongoing  -TM     STG 2  Patient will have cervical rotation 60° bilaterally  -TM     STG 2 Progress  Ongoing  -TM     STG 3  Patient will have cervical lateral flexion 30° bilaterally  -TM     STG 3 Progress  Ongoing  -TM     STG 4  Patient have lumbar flexion within 12 inches of floor  -TM     STG 4 Progress  Ongoing  -TM     STG 5  Patient tolerate 45 minutes exercise without increased paresthesias of upper or lower extremities  -TM     STG 5 Progress  Ongoing  -TM        Long Term Goals    LTG 1  Patient will report 60-70% overall improvement  -TM     LTG 1 Progress  Ongoing  -TM     LTG 2  Patient tolerate walking 15 minutes without increased symptoms through grocery  shopping activities  -TM     LTG 2 Progress  Ongoing  -TM     LTG 3  Patient's manual muscle test shoulder flexion and abduction on the right 4+/5  -TM     LTG 3 Progress  Ongoing  -TM     LTG 4  Patient will have NDI score of 10% or less  -     LTG 4 Progress  Ongoing  -TM       User Key  (r) = Recorded By, (t) = Taken By, (c) = Cosigned By    Initials Name Provider Type    TM Rowan Hdz PTA Physical Therapy Assistant                         Time Calculation:   Start Time: 1520  Stop Time: 1620  Time Calculation (min): 60 min  Total Timed Code Minutes- PT: 45 minute(s)  Therapy Suggested Charges     Code   Minutes Charges    None           Therapy Charges for Today     Code Description Service Date Service Provider Modifiers Qty    62304075137 HC PT THER PROC EA 15 MIN 1/28/2019 Rowan Hdz, TANVIR GP 3    29350315963 HC PT TRACTION CERVICAL 1/28/2019 Rowan Hdz PTA GP 1                    Rowan Hdz PTA  1/28/2019

## 2019-01-31 ENCOUNTER — HOSPITAL ENCOUNTER (OUTPATIENT)
Dept: PHYSICAL THERAPY | Facility: HOSPITAL | Age: 64
Setting detail: THERAPIES SERIES
Discharge: HOME | End: 2019-01-31

## 2019-01-31 DIAGNOSIS — M47.812 SPONDYLOSIS OF CERVICAL REGION WITHOUT MYELOPATHY OR RADICULOPATHY: Primary | ICD-10-CM

## 2019-01-31 PROCEDURE — 97110 THERAPEUTIC EXERCISES: CPT

## 2019-01-31 PROCEDURE — 97012 MECHANICAL TRACTION THERAPY: CPT

## 2019-02-05 ENCOUNTER — APPOINTMENT (OUTPATIENT)
Dept: PHYSICAL THERAPY | Facility: HOSPITAL | Age: 64
End: 2019-02-05

## 2019-02-07 ENCOUNTER — HOSPITAL ENCOUNTER (OUTPATIENT)
Dept: PHYSICAL THERAPY | Facility: HOSPITAL | Age: 64
Setting detail: THERAPIES SERIES
Discharge: HOME | End: 2019-02-07

## 2019-02-07 DIAGNOSIS — M43.06 LUMBAR SPONDYLOLYSIS: ICD-10-CM

## 2019-02-07 DIAGNOSIS — M47.812 SPONDYLOSIS OF CERVICAL REGION WITHOUT MYELOPATHY OR RADICULOPATHY: Primary | ICD-10-CM

## 2019-02-07 PROCEDURE — 97110 THERAPEUTIC EXERCISES: CPT | Performed by: PHYSICAL THERAPIST

## 2019-02-07 PROCEDURE — 97140 MANUAL THERAPY 1/> REGIONS: CPT | Performed by: PHYSICAL THERAPIST

## 2019-02-07 PROCEDURE — 97012 MECHANICAL TRACTION THERAPY: CPT | Performed by: PHYSICAL THERAPIST

## 2019-02-07 NOTE — THERAPY PROGRESS REPORT/RE-CERT
Outpatient Physical Therapy Ortho Progress Note  [facility]     Patient Name: Jimmie Ortega  : 1955  MRN: [id number]  Today's Date: 2019      Visit Date: 2019      Attendance    Authorized Medicare   Pre Rx pain 3   Post Rx pain 2   % improvement 60   MD follow up PRN   Recert date            Visit Dx:    ICD-10-CM ICD-9-CM   1. Spondylosis of cervical region without myelopathy or radiculopathy M47.812 721.0   2. Lumbar spondylolysis M43.06 738.4       Patient Active Problem List   Diagnosis   • Left wrist pain   • Closed displaced fracture of middle third of navicular bone of left wrist   • Trigger index finger of right hand   • Coronary artery disease involving native coronary artery of native heart without angina pectoris   • Mixed hyperlipidemia   • Lumbar spondylosis   • Low back pain   • Osteoarthritis of cervical spine   • Lumbar spondylolysis        Past Medical History:   Diagnosis Date   • Cardiac disease    • Cerebral palsy (CMS/HCC)    • Renal cancer (CMS/HCC)         Past Surgical History:   Procedure Laterality Date   • APPENDECTOMY     • CARDIAC CATHETERIZATION     • NEPHRECTOMY     • TENDON REPAIR         PT Ortho     Row Name 19 1300       Subjective Comments    Subjective Comments  Rode in back seat to and from [employer] to the [employer] game  -DD       Precautions and Contraindications    Precautions  lumbar and cervical spondylosis  -DD       Subjective Pain    Able to rate subjective pain?  yes  -DD    Subjective Pain Comment  more sore today  -DD       General ROM    GENERAL ROM COMMENTS  Cervical extension 26 degrees, 40 degrees flexion.  Right rotation 67 degrees/L 65 degrees; right lateral flexion 30 degrees/left 27  -DD       MMT (Manual Muscle Testing)    General MMT Comments  The right flexion 4+, left abduction 4  -DD       Sensation    Sensation WNL?  WNL  -DD    Light Touch  No apparent deficits  -DD    Additional Comments  And radicular symptoms  on the right upper extremity  -DD      User Key  (r) = Recorded By, (t) = Taken By, (c) = Cosigned By    Initials Name Provider Type    Jessica Nix, PT DPT Physical Therapist                      PT Assessment/Plan     Row Name 02/07/19 1626          PT Assessment    Functional Limitations  Limitation in home management;Limitations in functional capacity and performance  -DD     Impairments  Pain;Range of motion;Muscle strength;Endurance  -DD     Assessment Comments  Doing well.  Show good improvement . Could benefit from a little strengthening of shoulder and improved posture  -DD     Please refer to paper survey for additional self-reported information  Yes  -DD     Rehab Potential  Good  -DD     Patient/caregiver participated in establishment of treatment plan and goals  Yes  -DD     Patient would benefit from skilled therapy intervention  Yes  -DD        PT Plan    PT Frequency  2x/week  -DD     Predicted Duration of Therapy Intervention (Therapy Eval)  2  -DD     PT Plan Comments  dynadisc with upper body tband, manual, traction, nerve glides and neural tension stretches.    -DD       User Key  (r) = Recorded By, (t) = Taken By, (c) = Cosigned By    Initials Name Provider Type    Jessica Nix, PT DPT Physical Therapist          Modalities     Row Name 02/07/19 1300             Subjective Pain    Pre-Treatment Pain Level  3  -DD      Post-Treatment Pain Level  1  -DD         Moist Heat    MH Applied  Yes  -DD      Location  cervical during traction  -DD      Rx Minutes  15 mins  -DD         Traction 92993    Traction Type  Cervical  -DD      Rx Minutes  15  -DD      Duration  Intermittent  -DD      Position  Hook-lying  -DD      Weight  30 25#  -DD      Hold  60  -DD      Relax  10  -DD      Progression  1  -DD      Regression  1  -DD        User Key  (r) = Recorded By, (t) = Taken By, (c) = Cosigned By    Initials Name Provider Type    Jessica Nix, PT DPT Physical  "Therapist          Exercises     Row Name 02/07/19 1300             Subjective Comments    Subjective Comments  Rode in back seat to and from [employer] to the ball game  -DD         Subjective Pain    Able to rate subjective pain?  yes  -DD      Pre-Treatment Pain Level  3  -DD      Post-Treatment Pain Level  1  -DD      Subjective Pain Comment  more sore today  -DD         Exercise 1    Exercise Name 1  cervical ROM  -DD      Time 1  3'  -DD         Exercise 2    Exercise Name 2  passive levator stretch  -DD      Reps 2  2/30\"  -DD         Exercise 3    Exercise Name 3  MFR R scap trigger pt  -DD      Time 3  5'  -DD         Exercise 4    Exercise Name 4  tricep stretch/ trunk SB L  -DD      Reps 4  2/30\"  -DD         Exercise 5    Exercise Name 5  posterior shoulder stretch  -DD      Reps 5  3/30\"  -DD         Exercise 6    Exercise Name 6  Tband row 1 arm  -DD      Reps 6  20  -DD      Additional Comments  green  -DD         Exercise 7    Exercise Name 7  horz add  -DD      Reps 7  20  -DD      Additional Comments  green band  -DD         Exercise 8    Exercise Name 8  horz abd  -DD      Reps 8  20  -DD      Additional Comments  green  -DD         Exercise 9    Exercise Name 9  band flexion  -DD      Reps 9  13  -DD      Additional Comments  green  -DD         Exercise 10    Exercise Name 10  DB flexion over head  -DD      Reps 10  20  -DD      Additional Comments  1#  -DD         Exercise 11    Exercise Name 11  manual  -DD         Exercise 12    Exercise Name 12  traction  -DD        User Key  (r) = Recorded By, (t) = Taken By, (c) = Cosigned By    Initials Name Provider Type    DD Jessica Frye L, PT DPT Physical Therapist                        Manual Rx (last 36 hours)      Manual Treatments     Row Name 02/07/19 1300             Manual Rx 1    Manual Rx 1 Location  suboccipital  -DD      Manual Rx 1 Type  release  -DD      Manual Rx 1 Duration  3 min  -DD         Manual Rx 2    Manual Rx 2 Location  " cervical   -DD      Manual Rx 2 Type  PA glides  -DD      Manual Rx 2 Duration  3 min  -DD         Manual Rx 3    Manual Rx 3 Location  UE nerve glides  -DD      Manual Rx 3 Duration  3  -DD        User Key  (r) = Recorded By, (t) = Taken By, (c) = Cosigned By    Initials Name Provider Type    Jessica Nix, PT DPT Physical Therapist          PT OP Goals     Row Name 02/07/19 1400 02/07/19 1300       PT Short Term Goals    STG 1  --  Patient independent in home exercise program  -DD    STG 1 Progress  --  Met  -DD    STG 2  --  Patient will have cervical rotation 60° bilaterally  -DD    STG 2 Progress  --  Met  -DD    STG 3  --  Patient will have cervical lateral flexion 30° bilaterally  -DD    STG 3 Progress  --  Partially Met  -DD    STG 3 Progress Comments  --  Left 27, Right 30  -DD    STG 4  --  Patient have lumbar flexion within 12 inches of floor  -DD    STG 4 Progress  --  Met  -DD    STG 5  --  Patient tolerate 45 minutes exercise without increased paresthesias of upper or lower extremities  -DD    STG 5 Progress  --  Ongoing  -DD       Long Term Goals    LTG 1  --  Patient will report 60-70% overall improvement  -DD    LTG 1 Progress  --  Met  -DD    LTG 2  --  Patient tolerate walking 15 minutes without increased symptoms through grocery shopping activities  -DD    LTG 2 Progress  --  Ongoing  -DD    LTG 3  --  Patient's manual muscle test shoulder flexion and abduction on the right 4+/5  -DD    LTG 3 Progress  --  Progressing  -DD    LTG 4  --  Patient will have NDI score of 10% or less  -DD    LTG 4 Progress  --  Ongoing  -DD    LTG 4 Progress Comments  --  14%  -DD       Time Calculation    PT Goal Re-Cert Due Date  02/28/19  -DD  --      User Key  (r) = Recorded By, (t) = Taken By, (c) = Cosigned By    Initials Name Provider Type    Jessica Nix, PT DPT Physical Therapist               Outcome Measure Options: Neck Disability Index (NDI)  Neck Disability Index  Section 1 -  Pain Intensity: The pain is very mild at the moment.  Section 2 - Personal Care: I can look after myself normally without causing extra pain.  Section 3 - Lifting: Pain prevents me from lifting heavy weights off the floor but I can manage if items are conveniently positioned, ie. on a table.  Section 4 - Work: I can do as much work as I want.  Section 5 - Headaches: I have slight headaches that come infrequently.  Section 6 - Concentration: I can concentrate fully without difficulty.  Section 7 - Sleeping: My sleep is slightly disturbed for less than 1 hour.  Section 8 - Driving: I can drive as long as I want with slight neck pain.  Section 9 - Reading: I can read as much as I want with slight neck pain.  Section 10 - Recreation: I have some neck pain with all recreational activities.  Neck Disability Index Score: 8      Time Calculation:   Start Time: 1302  Stop Time: 1405  Time Calculation (min): 63 min  Total Timed Code Minutes- PT: 43 minute(s)  Therapy Suggested Charges     Code   Minutes Charges    None           Therapy Charges for Today     Code Description Service Date Service Provider Modifiers Qty    39475479845 HC PT-TRACTION MECHANICAL 2/7/2019 Jessica Frye PT DPT  1    41450497011 HC PT MANUAL THERAPY EA 15 MIN 2/7/2019 Jessica Frye PT DPT GP 1    13106118991 HC PT THER PROC EA 15 MIN 2/7/2019 Jessica Frye PT DPT GP 2          PT G-Codes  Outcome Measure Options: Neck Disability Index (NDI)  Neck Disability Index Score: 8         Jessica Frye PT DPT  2/7/2019

## 2019-02-12 ENCOUNTER — APPOINTMENT (OUTPATIENT)
Dept: PHYSICAL THERAPY | Facility: HOSPITAL | Age: 64
End: 2019-02-12

## 2019-02-14 ENCOUNTER — HOSPITAL ENCOUNTER (OUTPATIENT)
Dept: PHYSICAL THERAPY | Facility: HOSPITAL | Age: 64
Setting detail: THERAPIES SERIES
Discharge: HOME | End: 2019-02-14

## 2019-02-14 DIAGNOSIS — M47.812 SPONDYLOSIS OF CERVICAL REGION WITHOUT MYELOPATHY OR RADICULOPATHY: Primary | ICD-10-CM

## 2019-02-14 PROCEDURE — 97110 THERAPEUTIC EXERCISES: CPT

## 2019-02-14 PROCEDURE — 97012 MECHANICAL TRACTION THERAPY: CPT

## 2019-02-14 NOTE — PROGRESS NOTES
Outpatient Physical Therapy Ortho Treatment Note   [facility]     Patient Name: Jimmie Ortega  : 1955  MRN: [id number]  Today's Date: 2019      Visit Date: 2019    Subjective Improvement:     60%  Attendance:  8/10  Approved:     Per Medicare MD follow up:    PRN       RC date:     19    Visit Dx:    ICD-10-CM ICD-9-CM   1. Spondylosis of cervical region without myelopathy or radiculopathy M47.812 721.0       Patient Active Problem List   Diagnosis   • Left wrist pain   • Closed displaced fracture of middle third of navicular bone of left wrist   • Trigger index finger of right hand   • Coronary artery disease involving native coronary artery of native heart without angina pectoris   • Mixed hyperlipidemia   • Lumbar spondylosis   • Low back pain   • Osteoarthritis of cervical spine   • Lumbar spondylolysis        Past Medical History:   Diagnosis Date   • Cardiac disease    • Cerebral palsy (CMS/HCC)    • Renal cancer (CMS/HCC)         Past Surgical History:   Procedure Laterality Date   • APPENDECTOMY     • CARDIAC CATHETERIZATION     • NEPHRECTOMY     • TENDON REPAIR         PT Ortho     Row Name 19 1400       Subjective Comments    Subjective Comments  Hurting between shoulder blades.  Been doing a lot of sitting.   -TM       Precautions and Contraindications    Precautions  lumbar and cervical spondylosis  -TM       Subjective Pain    Able to rate subjective pain?  yes  -TM    Pre-Treatment Pain Level  2  -TM    Post-Treatment Pain Level  1  -TM      User Key  (r) = Recorded By, (t) = Taken By, (c) = Cosigned By    Initials Name Provider Type    Rowan Armando, PTA Physical Therapy Assistant                      PT Assessment/Plan     Row Name 19 1500          PT Assessment    Assessment Comments  Tolerated therex well.  Limited on what he can do with L wrist.  Tried T Band with no money, but was unable.    -TM     Patient/caregiver  "participated in establishment of treatment plan and goals  Yes  -TM        PT Plan    PT Frequency  2x/week  -TM     Predicted Duration of Therapy Intervention (Therapy Eval)  2 weeks  -TM     PT Plan Comments  nerve glides/neural tension stretches  -TM       User Key  (r) = Recorded By, (t) = Taken By, (c) = Cosigned By    Initials Name Provider Type    TM Rowan Hdz PTA Physical Therapy Assistant          Modalities     Row Name 02/14/19 1400             Moist Heat    MH Applied  Yes  -TM      Location  cervical during traction  -TM      Rx Minutes  15 mins  -TM         Traction 69883    Traction Type  Cervical  -TM      Rx Minutes  15  -TM      Duration  Intermittent  -TM      Position  Hook-lying  -TM      Weight  30  -TM      Hold  60  -TM      Relax  10  -TM      Progression  1  -TM      Regression  1  -TM        User Key  (r) = Recorded By, (t) = Taken By, (c) = Cosigned By    Initials Name Provider Type    TM Rowan Hdz PTA Physical Therapy Assistant          Exercises     Row Name 02/14/19 1400             Subjective Comments    Subjective Comments  Hurting between shoulder blades.  Been doing a lot of sitting.   -TM         Subjective Pain    Able to rate subjective pain?  yes  -TM      Pre-Treatment Pain Level  2  -TM      Post-Treatment Pain Level  1  -TM         Exercise 1    Exercise Name 1  UT stretch  -TM      Reps 1  2  -TM      Time 1  30\"  -TM         Exercise 2    Exercise Name 2  levator stretch  -TM      Reps 2  2  -TM      Time 2  30\"  -TM         Exercise 3    Exercise Name 3  cervical ROM rotation  -TM      Reps 3  10  -TM         Exercise 4    Exercise Name 4  posterior shoulder stretch  -TM      Reps 4  2  -TM      Time 4  30\"  -TM         Exercise 5    Exercise Name 5  T Band horiz Abd  -TM      Sets 5  2  -TM      Reps 5  10  -TM      Additional Comments  green  -TM         Exercise 6    Exercise Name 6  shoulder flexion overhead  -TM      Sets 6  2  -TM      Reps 6  " "10  -TM      Additional Comments  1# DB  -TM         Exercise 7    Exercise Name 7  no money  -TM      Sets 7  2  -TM      Reps 7  10  -TM      Time 7  5\" hold  -TM        User Key  (r) = Recorded By, (t) = Taken By, (c) = Cosigned By    Initials Name Provider Type    TM HdzRowan lawson PTA Physical Therapy Assistant                        Manual Rx (last 36 hours)      Manual Treatments     Row Name 02/14/19 1300             Manual Rx 1    Manual Rx 1 Location  suboccipital  -TM      Manual Rx 1 Type  release  -TM      Manual Rx 1 Duration  3 min  -TM         Manual Rx 2    Manual Rx 2 Location  cervical   -TM      Manual Rx 2 Type  PA glides  -TM      Manual Rx 2 Duration  3 min  -TM        User Key  (r) = Recorded By, (t) = Taken By, (c) = Cosigned By    Initials Name Provider Type    TM Rowan Hdz, TANVIR Physical Therapy Assistant          PT OP Goals     Row Name 02/14/19 1500          PT Short Term Goals    STG 1  Patient independent in home exercise program  -TM     STG 1 Progress  Met  -TM     STG 2  Patient will have cervical rotation 60° bilaterally  -TM     STG 2 Progress  Met  -TM     STG 3  Patient will have cervical lateral flexion 30° bilaterally  -TM     STG 3 Progress  Partially Met  -TM     STG 4  Patient have lumbar flexion within 12 inches of floor  -TM     STG 4 Progress  Met  -TM     STG 5  Patient tolerate 45 minutes exercise without increased paresthesias of upper or lower extremities  -TM     STG 5 Progress  Ongoing  -TM        Long Term Goals    LTG 1  Patient will report 60-70% overall improvement  -TM     LTG 1 Progress  Met  -TM     LTG 2  Patient tolerate walking 15 minutes without increased symptoms through grocery shopping activities  -TM     LTG 2 Progress  Ongoing  -TM     LTG 3  Patient's manual muscle test shoulder flexion and abduction on the right 4+/5  -TM     LTG 3 Progress  Progressing  -TM     LTG 4  Patient will have NDI score of 10% or less  -TM     LTG 4 " Progress  Ongoing  -       User Key  (r) = Recorded By, (t) = Taken By, (c) = Cosigned By    Initials Name Provider Type    TM Rowan Hdz PTA Physical Therapy Assistant                         Time Calculation:   Start Time: 1430  Stop Time: 1510  Time Calculation (min): 40 min  Total Timed Code Minutes- PT: 40 minute(s)  Therapy Suggested Charges     Code   Minutes Charges    None           Therapy Charges for Today     Code Description Service Date Service Provider Modifiers Qty    26280874327 HC PT THER PROC EA 15 MIN 2/14/2019 Rowan Hdz, TANVIR GP 2    23500591393 HC PT TRACTION CERVICAL 2/14/2019 Rowan Hdz, TANVIR GP 1                    Rowan Hdz PTA  2/14/2019

## 2019-02-18 ENCOUNTER — HOSPITAL ENCOUNTER (OUTPATIENT)
Dept: PHYSICAL THERAPY | Facility: HOSPITAL | Age: 64
Setting detail: THERAPIES SERIES
Discharge: HOME | End: 2019-02-18

## 2019-02-18 DIAGNOSIS — M47.812 SPONDYLOSIS OF CERVICAL REGION WITHOUT MYELOPATHY OR RADICULOPATHY: Primary | ICD-10-CM

## 2019-02-18 PROCEDURE — 97110 THERAPEUTIC EXERCISES: CPT

## 2019-02-18 PROCEDURE — 97012 MECHANICAL TRACTION THERAPY: CPT

## 2019-02-18 NOTE — PROGRESS NOTES
Outpatient Physical Therapy Ortho Treatment Note   [facility]     Patient Name: Jimmie Ortega  : 1955  MRN: [id number]  Today's Date: 2019      Visit Date: 2019    Subjective Improvement:     60%  Attendance:   Approved:     Per Medicare MD follow up:    PRN       RC date:    19     Visit Dx:    ICD-10-CM ICD-9-CM   1. Spondylosis of cervical region without myelopathy or radiculopathy M47.812 721.0       Patient Active Problem List   Diagnosis   • Left wrist pain   • Closed displaced fracture of middle third of navicular bone of left wrist   • Trigger index finger of right hand   • Coronary artery disease involving native coronary artery of native heart without angina pectoris   • Mixed hyperlipidemia   • Lumbar spondylosis   • Low back pain   • Osteoarthritis of cervical spine   • Lumbar spondylolysis        Past Medical History:   Diagnosis Date   • Cardiac disease    • Cerebral palsy (CMS/HCC)    • Renal cancer (CMS/HCC)         Past Surgical History:   Procedure Laterality Date   • APPENDECTOMY     • CARDIAC CATHETERIZATION     • NEPHRECTOMY     • TENDON REPAIR         PT Ortho     Row Name 19 1600       Precautions and Contraindications    Precautions  lumbar and cervical spondylosis  -TM       Subjective Pain    Able to rate subjective pain?  yes  -TM    Post-Treatment Pain Level  2  -TM      User Key  (r) = Recorded By, (t) = Taken By, (c) = Cosigned By    Initials Name Provider Type    TM Rowan Hdz, PTA Physical Therapy Assistant                      PT Assessment/Plan     Row Name 19 1600          PT Assessment    Assessment Comments  Pt c/o more tingling in hands, but less numbness.  Still having pain between shoulder blades.  Had difficulty keeping strap on forehead during traction & neck got lifted up out of headrest.  -TM     Patient/caregiver participated in establishment of treatment plan and goals  Yes  -TM        PT Plan     "PT Frequency  2x/week  -TM     Predicted Duration of Therapy Intervention (Therapy Eval)  2 weeks  -TM     PT Plan Comments  Pt scheduled for MD followup 2-22.    -TM       User Key  (r) = Recorded By, (t) = Taken By, (c) = Cosigned By    Initials Name Provider Type    TM Rowan Hdz PTA Physical Therapy Assistant          Modalities     Row Name 02/18/19 1600             Moist Heat    MH Applied  Yes  -TM      Location  cervical during traction  -TM      Rx Minutes  15 mins  -TM         Traction 05146    Traction Type  Cervical  -TM      Rx Minutes  15  -TM      Duration  Intermittent  -TM      Position  Hook-lying  -TM      Weight  30  -TM      Hold  60  -TM      Relax  10  -TM      Progression  1  -TM      Regression  1  -TM        User Key  (r) = Recorded By, (t) = Taken By, (c) = Cosigned By    Initials Name Provider Type    TM Rowan Hdz PTA Physical Therapy Assistant          Exercises     Row Name 02/18/19 1600             Subjective Comments    Subjective Comments  Has constant pain between shoulder blades.  Is having more tingling in hands, but not as mjch numbness.  Feels that he should followup with MD.    -TM         Subjective Pain    Able to rate subjective pain?  yes  -TM      Pre-Treatment Pain Level  2  -TM      Post-Treatment Pain Level  2  -TM         Exercise 1    Exercise Name 1  UT stretch  -TM      Reps 1  2  -TM      Time 1  30\"  -TM         Exercise 2    Exercise Name 2  levator stretch  -TM      Reps 2  2  -TM      Time 2  30\"  -TM         Exercise 3    Exercise Name 3  cervical ROM rotation  -TM      Reps 3  10  -TM         Exercise 4    Exercise Name 4  swim glides  -TM      Reps 4  10  -TM         Exercise 5    Exercise Name 5  T Band horiz Abd  -TM      Sets 5  2  -TM      Reps 5  10  -TM      Additional Comments  green  -TM         Exercise 6    Exercise Name 6  physioball PN shoulder flexion overhead  -TM      Sets 6  2  -TM      Reps 6  10  -TM      Additional " "Comments  1# DB  -TM         Exercise 7    Exercise Name 7  physioball PN no money  -TM      Sets 7  2  -TM      Reps 7  10  -TM      Time 7  5\" hold  -TM         Exercise 8    Exercise Name 8  physioball PN T Band rows  -TM      Reps 8  20  -TM      Additional Comments  green  -TM         Exercise 9    Exercise Name 9  physioball PN T Band ext  -TM      Reps 9  20  -TM      Additional Comments  green  -TM         Exercise 10    Exercise Name 10  --  -TM      Sets 10  --  -TM      Reps 10  --  -TM      Additional Comments  --  -TM        User Key  (r) = Recorded By, (t) = Taken By, (c) = Cosigned By    Initials Name Provider Type    TM Rowan Hdz PTA Physical Therapy Assistant                         PT OP Goals     Row Name 02/18/19 1700          PT Short Term Goals    STG 1  Patient independent in home exercise program  -TM     STG 1 Progress  Met  -TM     STG 2  Patient will have cervical rotation 60° bilaterally  -TM     STG 2 Progress  Met  -TM     STG 3  Patient will have cervical lateral flexion 30° bilaterally  -TM     STG 3 Progress  Partially Met  -TM     STG 4  Patient have lumbar flexion within 12 inches of floor  -TM     STG 4 Progress  Met  -TM     STG 5  Patient tolerate 45 minutes exercise without increased paresthesias of upper or lower extremities  -TM     STG 5 Progress  Ongoing  -TM        Long Term Goals    LTG 1  Patient will report 60-70% overall improvement  -TM     LTG 1 Progress  Met  -TM     LTG 2  Patient tolerate walking 15 minutes without increased symptoms through grocery shopping activities  -TM     LTG 2 Progress  Ongoing  -TM     LTG 3  Patient's manual muscle test shoulder flexion and abduction on the right 4+/5  -TM     LTG 3 Progress  Progressing  -TM     LTG 4  Patient will have NDI score of 10% or less  -     LTG 4 Progress  Ongoing  -TM       User Key  (r) = Recorded By, (t) = Taken By, (c) = Cosigned By    Initials Name Provider Type    TM Rowan Hdz PTA " Physical Therapy Assistant                         Time Calculation:   Start Time: 1605  Stop Time: 1650  Time Calculation (min): 45 min  Total Timed Code Minutes- PT: 30 minute(s)  Therapy Suggested Charges     Code   Minutes Charges    None           Therapy Charges for Today     Code Description Service Date Service Provider Modifiers Qty    49529253508 HC PT THER PROC EA 15 MIN 2/18/2019 Rowan Hdz, PTA GP 2    53861111646 HC PT TRACTION CERVICAL 2/18/2019 oRwan Hdz, PTA GP 1                    Rowan Hdz, TANVIR  2/18/2019

## 2019-02-21 ENCOUNTER — HOSPITAL ENCOUNTER (OUTPATIENT)
Dept: PHYSICAL THERAPY | Facility: HOSPITAL | Age: 64
Setting detail: THERAPIES SERIES
Discharge: HOME | End: 2019-02-21

## 2019-02-21 DIAGNOSIS — M47.812 SPONDYLOSIS OF CERVICAL REGION WITHOUT MYELOPATHY OR RADICULOPATHY: Primary | ICD-10-CM

## 2019-02-21 DIAGNOSIS — M43.06 LUMBAR SPONDYLOLYSIS: ICD-10-CM

## 2019-02-21 PROCEDURE — 97110 THERAPEUTIC EXERCISES: CPT | Performed by: PHYSICAL THERAPIST

## 2019-02-21 PROCEDURE — 97012 MECHANICAL TRACTION THERAPY: CPT | Performed by: PHYSICAL THERAPIST

## 2019-02-21 PROCEDURE — 97140 MANUAL THERAPY 1/> REGIONS: CPT | Performed by: PHYSICAL THERAPIST

## 2019-02-21 NOTE — THERAPY TREATMENT NOTE
Outpatient Physical Therapy Ortho Treatment Note  [facility]     Patient Name: Jimmie Ortega  : 1955  MRN: [id number]  Today's Date: 2019      Visit Date: 2019  10/12 appts, Medicare, MD 19 recert   Visit Dx:    ICD-10-CM ICD-9-CM   1. Spondylosis of cervical region without myelopathy or radiculopathy M47.812 721.0   2. Lumbar spondylolysis M43.06 738.4       Patient Active Problem List   Diagnosis   • Left wrist pain   • Closed displaced fracture of middle third of navicular bone of left wrist   • Trigger index finger of right hand   • Coronary artery disease involving native coronary artery of native heart without angina pectoris   • Mixed hyperlipidemia   • Lumbar spondylosis   • Low back pain   • Osteoarthritis of cervical spine   • Lumbar spondylolysis        Past Medical History:   Diagnosis Date   • Cardiac disease    • Cerebral palsy (CMS/HCC)    • Renal cancer (CMS/HCC)         Past Surgical History:   Procedure Laterality Date   • APPENDECTOMY     • CARDIAC CATHETERIZATION     • NEPHRECTOMY     • TENDON REPAIR         PT Ortho     Row Name 19 1305       Subjective Comments    Subjective Comments  Doing much better, does want to discuss continued N/T with MD  -SHELLEY       Precautions and Contraindications    Precautions  lumbar and cervical spondylosis  -DD       Subjective Pain    Able to rate subjective pain?  yes  -DD    Pre-Treatment Pain Level  2  -DD    Post-Treatment Pain Level  2  -DD       General ROM    GENERAL ROM COMMENTS  see MD note in media.  -DD    Row Name 19 1600       Precautions and Contraindications    Precautions  lumbar and cervical spondylosis  -TM       Subjective Pain    Able to rate subjective pain?  yes  -TM    Post-Treatment Pain Level  2  -TM      User Key  (r) = Recorded By, (t) = Taken By, (c) = Cosigned By    Initials Name Provider Type    TM Rowan Hdz PTA Physical Therapy Assistant    Jessica Nix  PT DPT Physical Therapist          PT Assessment/Plan     Row Name 02/21/19 9923          PT Assessment    Functional Limitations  Limitations in community activities;Performance in leisure activities;Performance in work activities  -DD     Impairments  Pain  -DD     Assessment Comments  Reports subjective improvement of 50%.  He feels like he is doing well with home exercise program.  He is returning to  to discuss continued numbness and tingling in the hands.  He has met multiple goals.  Follow-up per MD recommendation.  May benefit from further strengthening and posture scapular education  -DD     Rehab Potential  Good  -DD     Patient/caregiver participated in establishment of treatment plan and goals  Yes  -DD     Patient would benefit from skilled therapy intervention  Yes  -DD        PT Plan    PT Frequency  2x/week  -DD     Predicted Duration of Therapy Intervention (Therapy Eval)  2 weeks  -DD     Physical Therapy Interventions (Optional Details)  postural re-education;strengthening;stretching;home exercise program;manual therapy techniques  -DD     PT Plan Comments  Continue per MD recommendations. Pt benefits from manual and traction.  Working toward independent HEP.  -DD       User Key  (r) = Recorded By, (t) = Taken By, (c) = Cosigned By    Initials Name Provider Type    Jessica Nix, PT DPT Physical Therapist          Modalities     Row Name 02/21/19 1305             Traction 47745    Traction Type  Cervical  -DD      Rx Minutes  15  -DD      Duration  Intermittent  -DD      Position  Hook-lying  -DD      Weight  30  -DD      Hold  60  -DD      Relax  10  -DD      Progression  1  -DD      Regression  1  -DD        User Key  (r) = Recorded By, (t) = Taken By, (c) = Cosigned By    Initials Name Provider Type    Jessica Nix, PT DPT Physical Therapist          Exercises     Row Name 02/21/19 1307             Subjective Comments    Subjective Comments  Doing much better, does  want to discuss continued N/T with MD  -DD         Subjective Pain    Able to rate subjective pain?  yes  -DD      Pre-Treatment Pain Level  2  -DD      Post-Treatment Pain Level  2  -DD         Exercise 1    Exercise Name 1  manual  -DD         Exercise 2    Exercise Name 2  tball PN rows  -DD      Reps 2  20  -DD      Additional Comments  green  -DD         Exercise 3    Exercise Name 3  cervical ROM all planes  -DD      Time 3  3'  -DD         Exercise 4    Exercise Name 4  resisted isometric UE  -DD      Time 4  3'  -DD         Exercise 5    Exercise Name 5  T Band horiz Abd  -DD      Sets 5  2  -DD      Reps 5  10  -DD         Exercise 6    Exercise Name 6  clock diagonals  -DD      Sets 6  2  -DD      Reps 6  10  -DD      Additional Comments  green  -DD         Exercise 7    Exercise Name 7  hip abd t band  -DD      Sets 7  2  -DD      Reps 7  10  -DD      Additional Comments  green maribell  -DD         Exercise 8    Exercise Name 8  tband leg press  -DD      Reps 8  20 maribell  -DD      Additional Comments  green  -DD         Exercise 9    Exercise Name 9  physioball PN T Band ext  -DD      Reps 9  20  -DD      Additional Comments  green  -DD        User Key  (r) = Recorded By, (t) = Taken By, (c) = Cosigned By    Initials Name Provider Type    DD Jessica Frye, PT DPT Physical Therapist               Manual Rx (last 36 hours)      Manual Treatments     Row Name 02/21/19 1305             Manual Rx 1    Manual Rx 1 Location  suboccipital  -DD      Manual Rx 1 Type  release  -DD      Manual Rx 1 Duration  3 min  -DD         Manual Rx 2    Manual Rx 2 Location  cervical   -DD      Manual Rx 2 Type  PA glides/ transverse  -DD      Manual Rx 2 Duration  5 min  -DD         Manual Rx 3    Manual Rx 3 Location  cervical  -DD      Manual Rx 3 Type  distraction  -DD      Manual Rx 3 Duration  3  -DD         Manual Rx 4    Manual Rx 4 Location  UT   -DD      Manual Rx 4 Type  MFR  -DD      Manual Rx 4 Duration  3  -DD         User Key  (r) = Recorded By, (t) = Taken By, (c) = Cosigned By    Initials Name Provider Type    Jessica Nix, PT DPT Physical Therapist          PT OP Goals     Row Name 02/21/19 1432          Time Calculation    PT Goal Re-Cert Due Date  03/14/19  -DD       User Key  (r) = Recorded By, (t) = Taken By, (c) = Cosigned By    Initials Name Provider Type    DD Jessica Frye, PT DPT Physical Therapist             Time Calculation:   Start Time: 1305  Stop Time: 1400  Time Calculation (min): 55 min  Total Timed Code Minutes- PT: 40 minute(s)  Therapy Suggested Charges     Code   Minutes Charges    None           Therapy Charges for Today     Code Description Service Date Service Provider Modifiers Qty    84033545725 HC PT MANUAL THERAPY EA 15 MIN 2/21/2019 Jessica Frye, PT DPT GP 1    64809338041 HC PT-TRACTION MECHANICAL 2/21/2019 Jessica Frye, PT DPT  1    87521218526 HC PT THER PROC EA 15 MIN 2/21/2019 Jessica Frye, PT DPT GP 2            Jessica BEE. Uday, PT DPT  2/21/2019

## 2019-02-22 ENCOUNTER — OFFICE VISIT (OUTPATIENT)
Dept: ORTHOPEDIC SURGERY | Facility: CLINIC | Age: 64
End: 2019-02-22

## 2019-02-22 VITALS — WEIGHT: 230 LBS | BODY MASS INDEX: 36.1 KG/M2 | HEIGHT: 67 IN

## 2019-02-22 DIAGNOSIS — S62.022K CLOSED DISPLACED FRACTURE OF MIDDLE THIRD OF SCAPHOID OF LEFT WRIST WITH NONUNION, SUBSEQUENT ENCOUNTER: ICD-10-CM

## 2019-02-22 DIAGNOSIS — M47.816 LUMBAR SPONDYLOSIS: Primary | ICD-10-CM

## 2019-02-22 DIAGNOSIS — M47.812 SPONDYLOSIS OF CERVICAL REGION WITHOUT MYELOPATHY OR RADICULOPATHY: ICD-10-CM

## 2019-02-22 PROCEDURE — 99214 OFFICE O/P EST MOD 30 MIN: CPT | Performed by: ORTHOPAEDIC SURGERY

## 2019-02-22 NOTE — PROGRESS NOTES
"Jimmie Ortega is a 64 y.o. male returns for     Chief Complaint   Patient presents with   • Cervical Spine - Follow-up   • Left Wrist - Follow-up       HISTORY OF PRESENT ILLNESS:  F/u on left wrist  And neck pain, patient states that physical therapy is helping, he has occasional tingling in his right arm which is new.  Still has the neck and back pain he has had therapy and he believes this is helping him.       CONCURRENT MEDICAL HISTORY:    Past Medical History:   Diagnosis Date   • Cardiac disease    • Cerebral palsy (CMS/HCC)    • Renal cancer (CMS/HCC)        Allergies   Allergen Reactions   • Morphine Nausea Only         Current Outpatient Medications:   •  albuterol 108 (90 Base) MCG/ACT inhaler, Inhale 2 puffs Every 4 (Four) Hours As Needed for Wheezing., Disp: 1 inhaler, Rfl: 11  •  aspirin 81 MG EC tablet, Take 81 mg by mouth Daily., Disp: , Rfl:   •  atorvastatin (LIPITOR) 20 MG tablet, Take 20 mg by mouth Every Night., Disp: , Rfl:   •  cyproheptadine 2 MG/5ML syrup, Take 5-10 mL by mouth Every 8 (Eight) Hours., Disp: 240 mL, Rfl: 12  •  isosorbide mononitrate (IMDUR) 30 MG 24 hr tablet, Take 30 mg by mouth Daily., Disp: , Rfl:   •  lisinopril-hydrochlorothiazide (PRINZIDE,ZESTORETIC) 20-12.5 MG per tablet, TAKE 1 TABLET BY MOUTH DAILY, Disp: 90 tablet, Rfl: 3  •  metoprolol succinate XL (TOPROL-XL) 25 MG 24 hr tablet, Take 25 mg by mouth Daily., Disp: , Rfl:   •  traZODone (DESYREL) 50 MG tablet, Take 1-3 tablets by mouth Every Night., Disp: 90 tablet, Rfl: 11    Past Surgical History:   Procedure Laterality Date   • APPENDECTOMY     • CARDIAC CATHETERIZATION     • NEPHRECTOMY     • TENDON REPAIR             ROS: Review of systems has been updated as of today's date.  All other systems are negative except as noted previously.    PHYSICAL EXAMINATION:       Ht 170.2 cm (67\")   Wt 104 kg (230 lb)   BMI 36.02 kg/m²     Physical Exam   Constitutional: He is oriented to person, place, and time. He " appears well-developed.   HENT:   Head: Normocephalic and atraumatic.   Eyes: EOM are normal. Pupils are equal, round, and reactive to light.   Neck: Neck supple. No tracheal deviation present.   Pulmonary/Chest: Effort normal.   Musculoskeletal: He exhibits tenderness and deformity. He exhibits no edema.   Neurological: He is alert and oriented to person, place, and time.   Skin: Skin is warm and dry. No erythema.   Psychiatric: He has a normal mood and affect.       GAIT:     [x]  Normal  []  Antalgic    Assistive device: [x]  None  []  Walker     []  Crutches  []  Cane     []  Wheelchair  []  Stretcher    Ortho Exam  No Tinel's at the wrist or elbow.  Good motion.  Motion of the neck is somewhat improved.  He has some tenderness over the left wrist in the region of the scaphoid.  Does have some tenderness about the back good motion of the hip neurologically intact lower extremities.    X-ray of the back lumbar 2 views show pretty significant lumbar spondylosis with a degenerative spondylolisthesis as well as some degree of degenerative scoliosis.  Likewise the cervical spine has pretty significant cervical spondylosis on 2 views.  There is marked narrowing at C6-7 as well as C5-6 and to lesser extent L4-5.  There is certainly loss of lordosis as well.          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  Of gone over his x-rays in detail with him.  I think after talking with him quite a while the numbness that he has in his right arm is related to his ulnar nerve when he sitting in his chair and resting his elbow on the arm of the chair is when he notices that he symptomatic.  He will change this pattern is see  if it does not go away.  He will continue his therapy.  We have gone over his x-rays again.  I think regard to his wrist we discussed treatment is involuntary movements bothering him quite a bit.  He is consider having something done I think is problematic is what to do.  We did immobilize it for a while but I would likely send him to the hand guys in [place] if it is still bothersome.  I will see him back at any time he has a very good understanding of these issues.    No Follow-up on file.      This document has been electronically signed by Kvng Mann MD on February 22, 2019 4:11 PM

## 2019-02-25 ENCOUNTER — HOSPITAL ENCOUNTER (OUTPATIENT)
Dept: PHYSICAL THERAPY | Facility: HOSPITAL | Age: 64
Setting detail: THERAPIES SERIES
Discharge: HOME | End: 2019-02-25

## 2019-02-25 DIAGNOSIS — M47.812 SPONDYLOSIS OF CERVICAL REGION WITHOUT MYELOPATHY OR RADICULOPATHY: Primary | ICD-10-CM

## 2019-02-25 PROCEDURE — 97110 THERAPEUTIC EXERCISES: CPT

## 2019-02-25 PROCEDURE — 97012 MECHANICAL TRACTION THERAPY: CPT

## 2019-02-25 NOTE — PROGRESS NOTES
Outpatient Physical Therapy Ortho Treatment Note   [facility]     Patient Name: Jimmie Ortega  : 1955  MRN: [id number]  Today's Date: 2019      Visit Date: 2019    Subjective Improvement:    50%   Attendance:   Approved:     Per Medicare MD follow up:   XAVIER ZAPIEN date:    19     Visit Dx:    ICD-10-CM ICD-9-CM   1. Spondylosis of cervical region without myelopathy or radiculopathy M47.812 721.0       Patient Active Problem List   Diagnosis   • Left wrist pain   • Closed displaced fracture of middle third of navicular bone of left wrist   • Trigger index finger of right hand   • Coronary artery disease involving native coronary artery of native heart without angina pectoris   • Mixed hyperlipidemia   • Lumbar spondylosis   • Low back pain   • Spondylosis of cervical region without myelopathy or radiculopathy   • Lumbar spondylolysis        Past Medical History:   Diagnosis Date   • Cardiac disease    • Cerebral palsy (CMS/HCC)    • Renal cancer (CMS/HCC)         Past Surgical History:   Procedure Laterality Date   • APPENDECTOMY     • CARDIAC CATHETERIZATION     • NEPHRECTOMY     • TENDON REPAIR         PT Ortho     Row Name 19 1400       Subjective Comments    Subjective Comments  Saw MD Souza.  Cervical X-rays show fusion at lower C spine & some degeneration.  Is referring him to hand ortho specialist in [place] for wrist, with possible surgery.   -TM       Precautions and Contraindications    Precautions  lumbar and cervical spondylosis  -TM       Subjective Pain    Able to rate subjective pain?  yes  -TM    Pre-Treatment Pain Level  3  -TM    Post-Treatment Pain Level  2  -TM      User Key  (r) = Recorded By, (t) = Taken By, (c) = Cosigned By    Initials Name Provider Type    Rowan Armando PTA Physical Therapy Assistant                      PT Assessment/Plan     Row Name 19 1600          PT Assessment    Assessment Comments  Pt  tolerated red T Band better with LE therex, as green made his LEs sore.  No difficulty with traction today.    -TM     Patient/caregiver participated in establishment of treatment plan and goals  Yes  -TM        PT Plan    PT Frequency  2x/week  -TM     Predicted Duration of Therapy Intervention (Therapy Eval)  2 weeks  -TM     PT Plan Comments  physioball LAQ  -TM       User Key  (r) = Recorded By, (t) = Taken By, (c) = Cosigned By    Initials Name Provider Type    TM Rowan Hdz PTA Physical Therapy Assistant          Modalities     Row Name 02/25/19 1400             Traction 96151    Traction Type  Cervical  -TM      Rx Minutes  15  -TM      Duration  Intermittent  -TM      Position  Hook-lying  -TM      Weight  30  -TM      Hold  60  -TM      Relax  10  -TM      Progression  1  -TM      Regression  1  -TM        User Key  (r) = Recorded By, (t) = Taken By, (c) = Cosigned By    Initials Name Provider Type    TM Rowan Hdz PTA Physical Therapy Assistant          Exercises     Row Name 02/25/19 1400             Subjective Comments    Subjective Comments  Saw MD Souza.  Cervical X-rays show fusion at lower C spine & some degeneration.  Is referring him to hand ortho specialist in [place] for wrist, with possible surgery.   -TM         Subjective Pain    Able to rate subjective pain?  yes  -TM      Pre-Treatment Pain Level  3  -TM      Post-Treatment Pain Level  2  -TM         Exercise 1    Exercise Name 1  manual  -TM      Time 1  5 min  -TM         Exercise 2    Exercise Name 2  physioball PN rows  -TM      Reps 2  20  -TM      Additional Comments  green  -TM         Exercise 3    Exercise Name 3  physioball PN shoulder ext  -TM      Reps 3  20  -TM      Additional Comments  green  -TM         Exercise 4    Exercise Name 4  physioball PN scap clocks  -TM      Sets 4  2  -TM      Reps 4  10  -TM      Additional Comments  green  -TM         Exercise 5    Exercise Name 5  T Band hip Abd  -TM       Reps 5  20  -TM      Additional Comments  red  -TM         Exercise 6    Exercise Name 6  LAQ  -TM      Sets 6  2  -TM      Reps 6  10  -TM        User Key  (r) = Recorded By, (t) = Taken By, (c) = Cosigned By    Initials Name Provider Type    TM Rowan Hdz PTA Physical Therapy Assistant                        Manual Rx (last 36 hours)      Manual Treatments     Row Name 02/25/19 1500             Manual Rx 1    Manual Rx 1 Location  UT  -TM      Manual Rx 1 Type  MFR  -TM      Manual Rx 1 Duration  5 min  -TM        User Key  (r) = Recorded By, (t) = Taken By, (c) = Cosigned By    Initials Name Provider Type    TM Rowan Hdz PTA Physical Therapy Assistant          PT OP Goals     Row Name 02/25/19 1600          PT Short Term Goals    STG 1  Patient independent in home exercise program  -TM     STG 1 Progress  Met  -TM     STG 2  Patient will have cervical rotation 60° bilaterally  -TM     STG 2 Progress  Met  -TM     STG 3  Patient will have cervical lateral flexion 30° bilaterally  -TM     STG 3 Progress  Partially Met  -TM     STG 4  Patient have lumbar flexion within 12 inches of floor  -TM     STG 4 Progress  Met  -TM     STG 5  Patient tolerate 45 minutes exercise without increased paresthesias of upper or lower extremities  -     STG 5 Progress  Ongoing  -TM        Long Term Goals    LTG 1  Patient will report 60-70% overall improvement  -TM     LTG 1 Progress  Met  -TM     LTG 2  Patient tolerate walking 15 minutes without increased symptoms through grocery shopping activities  -TM     LTG 2 Progress  Ongoing  -TM     LTG 3  Patient's manual muscle test shoulder flexion and abduction on the right 4+/5  -TM     LTG 3 Progress  Progressing  -TM     LTG 4  Patient will have NDI score of 10% or less  -     LTG 4 Progress  Ongoing  -TM       User Key  (r) = Recorded By, (t) = Taken By, (c) = Cosigned By    Initials Name Provider Type    TM Rowan Hdz PTA Physical Therapy Assistant                          Time Calculation:   Start Time: 1438  Stop Time: 1525  Time Calculation (min): 47 min  Total Timed Code Minutes- PT: 32 minute(s)  Therapy Suggested Charges     Code   Minutes Charges    None           Therapy Charges for Today     Code Description Service Date Service Provider Modifiers Qty    01639029377 HC PT THER PROC EA 15 MIN 2/25/2019 Rowan Hdz, PTA GP 2    40605203902 HC PT TRACTION CERVICAL 2/25/2019 Rowan Hdz, TANVIR GP 1                    Rowan Hdz PTA  2/25/2019

## 2019-02-28 ENCOUNTER — TRANSCRIBE ORDERS (OUTPATIENT)
Dept: LAB | Facility: HOSPITAL | Age: 64
End: 2019-02-28

## 2019-02-28 ENCOUNTER — HOSPITAL ENCOUNTER (OUTPATIENT)
Dept: PHYSICAL THERAPY | Facility: HOSPITAL | Age: 64
Setting detail: THERAPIES SERIES
Discharge: HOME | End: 2019-02-28

## 2019-02-28 DIAGNOSIS — N18.30 CHRONIC KIDNEY DISEASE, STAGE III (MODERATE) (HCC): Primary | ICD-10-CM

## 2019-02-28 DIAGNOSIS — M43.06 LUMBAR SPONDYLOLYSIS: ICD-10-CM

## 2019-02-28 DIAGNOSIS — M47.812 SPONDYLOSIS OF CERVICAL REGION WITHOUT MYELOPATHY OR RADICULOPATHY: Primary | ICD-10-CM

## 2019-02-28 PROCEDURE — 97140 MANUAL THERAPY 1/> REGIONS: CPT | Performed by: PHYSICAL THERAPIST

## 2019-02-28 PROCEDURE — 97110 THERAPEUTIC EXERCISES: CPT | Performed by: PHYSICAL THERAPIST

## 2019-02-28 PROCEDURE — 97012 MECHANICAL TRACTION THERAPY: CPT | Performed by: PHYSICAL THERAPIST

## 2019-03-04 ENCOUNTER — HOSPITAL ENCOUNTER (OUTPATIENT)
Dept: PHYSICAL THERAPY | Facility: HOSPITAL | Age: 64
Setting detail: THERAPIES SERIES
Discharge: HOME | End: 2019-03-04

## 2019-03-04 DIAGNOSIS — M43.06 LUMBAR SPONDYLOLYSIS: ICD-10-CM

## 2019-03-04 DIAGNOSIS — M47.812 SPONDYLOSIS OF CERVICAL REGION WITHOUT MYELOPATHY OR RADICULOPATHY: Primary | ICD-10-CM

## 2019-03-04 PROCEDURE — 97110 THERAPEUTIC EXERCISES: CPT

## 2019-03-04 PROCEDURE — 97012 MECHANICAL TRACTION THERAPY: CPT

## 2019-03-04 PROCEDURE — 97140 MANUAL THERAPY 1/> REGIONS: CPT

## 2019-03-04 NOTE — PROGRESS NOTES
Outpatient Physical Therapy Ortho Treatment Note   [facility]     Patient Name: Jimmie Ortega  : 1955  MRN: [id number]  Today's Date: 3/4/2019      Visit Date: 2019    Subjective Improvement:     50%  Attendance:   Approved:    Per Medicare       MD follow up:     XAVIER ZAPIEN date:     NA    Visit Dx:    ICD-10-CM ICD-9-CM   1. Spondylosis of cervical region without myelopathy or radiculopathy M47.812 721.0   2. Lumbar spondylolysis M43.06 738.4       Patient Active Problem List   Diagnosis   • Left wrist pain   • Closed displaced fracture of middle third of navicular bone of left wrist   • Trigger index finger of right hand   • Coronary artery disease involving native coronary artery of native heart without angina pectoris   • Mixed hyperlipidemia   • Lumbar spondylosis   • Low back pain   • Spondylosis of cervical region without myelopathy or radiculopathy   • Lumbar spondylolysis        Past Medical History:   Diagnosis Date   • Cardiac disease    • Cerebral palsy (CMS/HCC)    • Renal cancer (CMS/HCC)         Past Surgical History:   Procedure Laterality Date   • APPENDECTOMY     • CARDIAC CATHETERIZATION     • NEPHRECTOMY     • TENDON REPAIR         PT Ortho     Row Name 19 1500       Subjective Pain    Able to rate subjective pain?  yes  -TM    Post-Treatment Pain Level  2  -TM      User Key  (r) = Recorded By, (t) = Taken By, (c) = Cosigned By    Initials Name Provider Type    TM Rowan Hdz, TANVIR Physical Therapy Assistant                      PT Assessment/Plan     Row Name 19 1500          PT Assessment    Assessment Comments  Pt tolerated therex without increase in pain.  Is reaching plateau in progress & will be D/Cd to HEP next visit.  -TM     Patient/caregiver participated in establishment of treatment plan and goals  Yes  -TM        PT Plan    PT Frequency  2x/week  -TM     Predicted Duration of Therapy Intervention (Therapy Eval)  1 more  "visit  -TM     PT Plan Comments  1 more visit to review HEP & update as indicated.     -TM       User Key  (r) = Recorded By, (t) = Taken By, (c) = Cosigned By    Initials Name Provider Type    TM HdzRowan, TANVIR Physical Therapy Assistant          Modalities     Row Name 03/04/19 1500             Traction 45459    Traction Type  Cervical  -TM      Rx Minutes  15  -TM      Duration  Intermittent  -TM      Position  Hook-lying  -TM      Weight  32  -TM      Hold  60  -TM      Relax  10  -TM      Progression  1  -TM      Regression  1  -TM        User Key  (r) = Recorded By, (t) = Taken By, (c) = Cosigned By    Initials Name Provider Type    TM HdzRowan PTA Physical Therapy Assistant          Exercises     Row Name 03/04/19 1500             Subjective Comments    Subjective Comments  Pt reports pain is about \"usual\".    -TM         Subjective Pain    Able to rate subjective pain?  yes  -TM      Pre-Treatment Pain Level  2  -TM      Post-Treatment Pain Level  2  -TM         Exercise 1    Exercise Name 1  CS AROM rotation  -TM      Reps 1  15  -TM         Exercise 2    Exercise Name 2  no money  -TM      Reps 2  20  -TM         Exercise 3    Exercise Name 3  manual- see flowsheet  -TM      Time 3  5 min  -TM         Exercise 4    Exercise Name 4  physioball PN T Band rows  -TM      Reps 4  20  -TM      Additional Comments  green  -TM         Exercise 5    Exercise Name 5  physioball PN T Band ext  -TM      Reps 5  20  -TM      Additional Comments  green  -TM         Exercise 6    Exercise Name 6  physioball PN LAQ  -TM      Sets 6  2  -TM      Reps 6  5  -TM         Exercise 7    Exercise Name 7  physioball PN march  -TM      Reps 7  10  -TM         Exercise 8    Exercise Name 8  CS isometrics flex, SB  -TM      Reps 8  10  -TM      Time 8  5\" hold  -TM         Exercise 9    Exercise Name 9  cervical traction  -TM      Time 9  15  -TM        User Key  (r) = Recorded By, (t) = Taken By, (c) = " Cosigned By    Initials Name Provider Type    TM Rowan Hdz, TANVIR Physical Therapy Assistant                        Manual Rx (last 36 hours)      Manual Treatments     Row Name 03/04/19 1500             Manual Rx 1    Manual Rx 1 Location  UT  -TM      Manual Rx 1 Type  MFR  -TM      Manual Rx 1 Duration  4 min  -TM         Manual Rx 2    Manual Rx 2 Location  cerivcal PIVM/ glides  -TM      Manual Rx 2 Duration  4 min  -TM        User Key  (r) = Recorded By, (t) = Taken By, (c) = Cosigned By    Initials Name Provider Type    TM Rowan Hdz, TANVIR Physical Therapy Assistant          PT OP Goals     Row Name 03/04/19 1500          PT Short Term Goals    STG 1  Patient independent in home exercise program  -TM     STG 1 Progress  Met  -TM     STG 2  Patient will have cervical rotation 60° bilaterally  -TM     STG 2 Progress  Met  -TM     STG 3  Patient will have cervical lateral flexion 30° bilaterally  -TM     STG 3 Progress  Partially Met  -TM     STG 4  Patient have lumbar flexion within 12 inches of floor  -TM     STG 4 Progress  Met  -TM     STG 5  Patient tolerate 45 minutes exercise without increased paresthesias of upper or lower extremities  -TM     STG 5 Progress  Met  -TM        Long Term Goals    LTG 1  Patient will report 60-70% overall improvement  -TM     LTG 1 Progress  Met  -TM     LTG 2  Patient tolerate walking 15 minutes without increased symptoms through grocery shopping activities  -TM     LTG 2 Progress  Ongoing  -TM     LTG 3  Patient's manual muscle test shoulder flexion and abduction on the right 4+/5  -TM     LTG 3 Progress  Progressing  -TM     LTG 4  Patient will have NDI score of 10% or less  -TM     LTG 4 Progress  Ongoing  -TM       User Key  (r) = Recorded By, (t) = Taken By, (c) = Cosigned By    Initials Name Provider Type    TM Rowan Hdz PTA Physical Therapy Assistant                         Time Calculation:   Start Time: 1515  Stop Time: 1555  Time  Calculation (min): 40 min  Total Timed Code Minutes- PT: 25 minute(s)  Therapy Suggested Charges     Code   Minutes Charges    None           Therapy Charges for Today     Code Description Service Date Service Provider Modifiers Qty    69613152148 HC PT MANUAL THERAPY EA 15 MIN 3/4/2019 Rowan Hdz, PTA GP 1    55682398197 HC PT THER PROC EA 15 MIN 3/4/2019 Rowan Hdz, PTA GP 1    08679620406 HC PT TRACTION CERVICAL 3/4/2019 Rowan Hdz, PTA GP 1                    Rowan Hdz, [facility]  3/4/2019

## 2019-03-07 ENCOUNTER — HOSPITAL ENCOUNTER (OUTPATIENT)
Dept: PHYSICAL THERAPY | Facility: HOSPITAL | Age: 64
Setting detail: THERAPIES SERIES
Discharge: HOME | End: 2019-03-07

## 2019-03-07 DIAGNOSIS — M43.06 LUMBAR SPONDYLOLYSIS: ICD-10-CM

## 2019-03-07 DIAGNOSIS — M47.812 SPONDYLOSIS OF CERVICAL REGION WITHOUT MYELOPATHY OR RADICULOPATHY: Primary | ICD-10-CM

## 2019-03-07 PROCEDURE — 97110 THERAPEUTIC EXERCISES: CPT

## 2019-03-07 PROCEDURE — 97012 MECHANICAL TRACTION THERAPY: CPT

## 2019-03-07 NOTE — THERAPY DISCHARGE NOTE
Outpatient Physical Therapy Ortho Treatment Note/Discharge Summary   David Azar     Patient Name: Jimmie Ortega  : 1955  MRN: [id number]  Today's Date: 3/7/2019      Visit Date: 2019    Subjective Improvement:     50%  Attendance:   Approved:      Per Medicare     MD follow up:   XAVIER ZAPIEN date:    NA     Visit Dx:    ICD-10-CM ICD-9-CM   1. Spondylosis of cervical region without myelopathy or radiculopathy M47.812 721.0   2. Lumbar spondylolysis M43.06 738.4       Patient Active Problem List   Diagnosis   • Left wrist pain   • Closed displaced fracture of middle third of navicular bone of left wrist   • Trigger index finger of right hand   • Coronary artery disease involving native coronary artery of native heart without angina pectoris   • Mixed hyperlipidemia   • Lumbar spondylosis   • Low back pain   • Spondylosis of cervical region without myelopathy or radiculopathy   • Lumbar spondylolysis        Past Medical History:   Diagnosis Date   • Cardiac disease    • Cerebral palsy (CMS/HCC)    • Renal cancer (CMS/HCC)         Past Surgical History:   Procedure Laterality Date   • APPENDECTOMY     • CARDIAC CATHETERIZATION     • NEPHRECTOMY     • TENDON REPAIR         PT Ortho     Row Name 19 1400       Subjective Comments    Subjective Comments  Pt reports pain in neck is always there.  -TM       Subjective Pain    Able to rate subjective pain?  yes  -TM    Pre-Treatment Pain Level  2  -TM       General ROM    GENERAL ROM COMMENTS  cervical rotation L 68°, R 72°; SB L 50°, R 38°  -TM      User Key  (r) = Recorded By, (t) = Taken By, (c) = Cosigned By    Initials Name Provider Type    Rowan Armando, PTA Physical Therapy Assistant                      PT Assessment/Plan     Row Name 19 1400          PT Assessment    Assessment Comments  Pt met all ROM goals.  Radicular symptoms improved with traction.   -TM     Patient/caregiver participated in  "establishment of treatment plan and goals  Yes  -TM        PT Plan    PT Plan Comments  D/C to independent self management with HEP.    -TM       User Key  (r) = Recorded By, (t) = Taken By, (c) = Cosigned By    Initials Name Provider Type    TM Rowan Hdz PTA Physical Therapy Assistant          Modalities     Row Name 03/07/19 1400             Traction 35098    Traction Type  Cervical  -TM      Rx Minutes  15  -TM      Duration  Intermittent  -TM      Position  Hook-lying  -TM      Weight  32  -TM      Hold  60  -TM      Relax  10  -TM      Progression  1  -TM      Regression  1  -TM        User Key  (r) = Recorded By, (t) = Taken By, (c) = Cosigned By    Initials Name Provider Type    TM Rowan Hdz PTA Physical Therapy Assistant          Exercises     Row Name 03/07/19 1400             Subjective Comments    Subjective Comments  Pt reports pain in neck is always there.  -TM         Subjective Pain    Able to rate subjective pain?  yes  -TM      Pre-Treatment Pain Level  2  -TM         Exercise 1    Exercise Name 1  UT stretch  -TM      Sets 1  2  -TM      Time 1  30\"  -TM         Exercise 2    Exercise Name 2  CS AROM rotation  -TM      Reps 2  10  -TM         Exercise 3    Exercise Name 3  CS AROM flex/ext  -TM      Reps 3  10  -TM         Exercise 4    Exercise Name 4  CS isometrics  -TM      Reps 4  10  -TM      Time 4  5\" hold  -TM         Exercise 5    Exercise Name 5  physioball PN T Band ext  -TM      Reps 5  20  -TM      Additional Comments  green  -TM         Exercise 6    Exercise Name 6  physioball PN LAQ  -TM      Reps 6  20  -TM         Exercise 7    Exercise Name 7  physioball PN march  -TM      Reps 7  20  -TM         Exercise 8    Exercise Name 8  physioball PN T Band rows  -TM      Reps 8  20  -TM         Exercise 9    Exercise Name 9  cervical traction  -TM      Time 9  15  -TM        User Key  (r) = Recorded By, (t) = Taken By, (c) = Cosigned By    Initials Name Provider Type "     Rowan Hdz PTA Physical Therapy Assistant                         PT OP Goals     Row Name 03/07/19 1400          PT Short Term Goals    STG 1  Patient independent in home exercise program  -TM     STG 1 Progress  Met  -TM     STG 2  Patient will have cervical rotation 60° bilaterally  -TM     STG 2 Progress  Met  -TM     STG 3  Patient will have cervical lateral flexion 30° bilaterally  -TM     STG 3 Progress  Met  -TM     STG 4  Patient have lumbar flexion within 12 inches of floor  -TM     STG 4 Progress  Met  -TM     STG 5  Patient tolerate 45 minutes exercise without increased paresthesias of upper or lower extremities  -TM     STG 5 Progress  Met  -TM        Long Term Goals    LTG 1  Patient will report 60-70% overall improvement  -TM     LTG 1 Progress  Met  -TM     LTG 2  Patient tolerate walking 15 minutes without increased symptoms through grocery shopping activities  -TM     LTG 2 Progress  Ongoing  -TM     LTG 3  Patient's manual muscle test shoulder flexion and abduction on the right 4+/5  -TM     LTG 3 Progress  Progressing  -TM     LTG 4  Patient will have NDI score of 10% or less  -     LTG 4 Progress  Ongoing  -TM       User Key  (r) = Recorded By, (t) = Taken By, (c) = Cosigned By    Initials Name Provider Type    TM Rowan Hdz PTA Physical Therapy Assistant               Outcome Measure Options: Neck Disability Index (NDI)         Time Calculation:   Start Time: 1430  Stop Time: 1510  Time Calculation (min): 40 min  Total Timed Code Minutes- PT: 40 minute(s)  Therapy Suggested Charges     Code   Minutes Charges    None           Therapy Charges for Today     Code Description Service Date Service Provider Modifiers Qty    65629955090 HC PT THER PROC EA 15 MIN 3/7/2019 Rowan Hdz PTA GP 2    50140455989 HC PT TRACTION CERVICAL 3/7/2019 Rowan Hdz PTA GP 1          PT G-Codes  Outcome Measure Options: Neck Disability Index (NDI)     OP PT Discharge  Summary  Date of Discharge: 03/07/19  Reason for Discharge: All goals achieved  Outcomes Achieved: Able to achieve all goals within established timeline  Discharge Destination: Home with home program      Rowan Hdz, PTA  3/7/2019

## 2019-03-15 DIAGNOSIS — Z79.899 ON LONG TERM DRUG THERAPY: Primary | ICD-10-CM

## 2019-03-15 RX ORDER — ATORVASTATIN CALCIUM 20 MG/1
TABLET, FILM COATED ORAL
Qty: 90 TABLET | Refills: 0 | Status: SHIPPED | OUTPATIENT
Start: 2019-03-15 | End: 2019-09-03 | Stop reason: SDUPTHER

## 2019-03-19 ENCOUNTER — LAB (OUTPATIENT)
Dept: LAB | Facility: HOSPITAL | Age: 64
End: 2019-03-19

## 2019-03-19 DIAGNOSIS — Z79.899 ON LONG TERM DRUG THERAPY: ICD-10-CM

## 2019-03-19 LAB
ALBUMIN SERPL-MCNC: 4.2 G/DL (ref 3.4–4.8)
ALBUMIN/GLOB SERPL: 1.6 G/DL (ref 1.1–1.8)
ALP SERPL-CCNC: 70 U/L (ref 38–126)
ALT SERPL W P-5'-P-CCNC: 31 U/L (ref 21–72)
ANION GAP SERPL CALCULATED.3IONS-SCNC: 10 MMOL/L (ref 5–15)
AST SERPL-CCNC: 19 U/L (ref 17–59)
BILIRUB SERPL-MCNC: 0.5 MG/DL (ref 0.2–1.3)
BUN BLD-MCNC: 27 MG/DL (ref 7–21)
BUN/CREAT SERPL: 17.5 (ref 7–25)
CALCIUM SPEC-SCNC: 9.7 MG/DL (ref 8.4–10.2)
CHLORIDE SERPL-SCNC: 101 MMOL/L (ref 95–110)
CO2 SERPL-SCNC: 22 MMOL/L (ref 22–31)
CREAT BLD-MCNC: 1.54 MG/DL (ref 0.7–1.3)
GFR SERPL CREATININE-BSD FRML MDRD: 45 ML/MIN/1.73 (ref 49–113)
GLOBULIN UR ELPH-MCNC: 2.7 GM/DL (ref 2.3–3.5)
GLUCOSE BLD-MCNC: 109 MG/DL (ref 60–100)
POTASSIUM BLD-SCNC: 4.4 MMOL/L (ref 3.5–5.1)
PROT SERPL-MCNC: 6.9 G/DL (ref 6.3–8.6)
SODIUM BLD-SCNC: 133 MMOL/L (ref 137–145)

## 2019-03-19 PROCEDURE — 80053 COMPREHEN METABOLIC PANEL: CPT | Performed by: INTERNAL MEDICINE

## 2019-04-03 ENCOUNTER — LAB (OUTPATIENT)
Dept: LAB | Facility: HOSPITAL | Age: 64
End: 2019-04-03

## 2019-04-03 ENCOUNTER — TRANSCRIBE ORDERS (OUTPATIENT)
Dept: LAB | Facility: HOSPITAL | Age: 64
End: 2019-04-03

## 2019-04-03 DIAGNOSIS — N18.30 CHRONIC KIDNEY DISEASE, STAGE III (MODERATE) (HCC): ICD-10-CM

## 2019-04-03 DIAGNOSIS — N18.30 CHRONIC KIDNEY DISEASE, STAGE III (MODERATE) (HCC): Primary | ICD-10-CM

## 2019-04-03 PROCEDURE — 85025 COMPLETE CBC W/AUTO DIFF WBC: CPT | Performed by: INTERNAL MEDICINE

## 2019-04-03 PROCEDURE — 82570 ASSAY OF URINE CREATININE: CPT | Performed by: INTERNAL MEDICINE

## 2019-04-03 PROCEDURE — 82306 VITAMIN D 25 HYDROXY: CPT | Performed by: INTERNAL MEDICINE

## 2019-04-03 PROCEDURE — 80069 RENAL FUNCTION PANEL: CPT | Performed by: INTERNAL MEDICINE

## 2019-04-03 PROCEDURE — 84156 ASSAY OF PROTEIN URINE: CPT | Performed by: INTERNAL MEDICINE

## 2019-04-03 PROCEDURE — 81003 URINALYSIS AUTO W/O SCOPE: CPT | Performed by: INTERNAL MEDICINE

## 2019-04-03 PROCEDURE — 83970 ASSAY OF PARATHORMONE: CPT | Performed by: INTERNAL MEDICINE

## 2019-04-04 LAB
25(OH)D3 SERPL-MCNC: 12.7 NG/ML (ref 30–100)
ALBUMIN SERPL-MCNC: 4.6 G/DL (ref 3.5–5.2)
ANION GAP SERPL CALCULATED.3IONS-SCNC: 15.8 MMOL/L
BASOPHILS # BLD AUTO: 0.03 10*3/MM3 (ref 0–0.2)
BASOPHILS NFR BLD AUTO: 0.5 % (ref 0–1.5)
BILIRUB UR QL STRIP: NEGATIVE
BUN BLD-MCNC: 24 MG/DL (ref 8–23)
BUN/CREAT SERPL: 16.7 (ref 7–25)
CALCIUM SPEC-SCNC: 9.7 MG/DL (ref 8.6–10.5)
CHLORIDE SERPL-SCNC: 99 MMOL/L (ref 98–107)
CLARITY UR: CLEAR
CO2 SERPL-SCNC: 20.2 MMOL/L (ref 22–29)
COLOR UR: YELLOW
CREAT BLD-MCNC: 1.44 MG/DL (ref 0.76–1.27)
CREAT UR-MCNC: 90 MG/DL
DEPRECATED RDW RBC AUTO: 42.6 FL (ref 37–54)
EOSINOPHIL # BLD AUTO: 0.1 10*3/MM3 (ref 0–0.4)
EOSINOPHIL NFR BLD AUTO: 1.6 % (ref 0.3–6.2)
ERYTHROCYTE [DISTWIDTH] IN BLOOD BY AUTOMATED COUNT: 12.6 % (ref 12.3–15.4)
GFR SERPL CREATININE-BSD FRML MDRD: 49 ML/MIN/1.73
GLUCOSE BLD-MCNC: 105 MG/DL (ref 65–99)
GLUCOSE UR STRIP-MCNC: NEGATIVE MG/DL
HCT VFR BLD AUTO: 44.6 % (ref 37.5–51)
HGB BLD-MCNC: 14.5 G/DL (ref 13–17.7)
HGB UR QL STRIP.AUTO: NEGATIVE
IMM GRANULOCYTES # BLD AUTO: 0.06 10*3/MM3 (ref 0–0.05)
IMM GRANULOCYTES NFR BLD AUTO: 1 % (ref 0–0.5)
KETONES UR QL STRIP: NEGATIVE
LEUKOCYTE ESTERASE UR QL STRIP.AUTO: NEGATIVE
LYMPHOCYTES # BLD AUTO: 1.74 10*3/MM3 (ref 0.7–3.1)
LYMPHOCYTES NFR BLD AUTO: 27.6 % (ref 19.6–45.3)
MCH RBC QN AUTO: 30.1 PG (ref 26.6–33)
MCHC RBC AUTO-ENTMCNC: 32.5 G/DL (ref 31.5–35.7)
MCV RBC AUTO: 92.7 FL (ref 79–97)
MONOCYTES # BLD AUTO: 0.81 10*3/MM3 (ref 0.1–0.9)
MONOCYTES NFR BLD AUTO: 12.8 % (ref 5–12)
NEUTROPHILS # BLD AUTO: 3.57 10*3/MM3 (ref 1.4–7)
NEUTROPHILS NFR BLD AUTO: 56.5 % (ref 42.7–76)
NITRITE UR QL STRIP: NEGATIVE
NRBC BLD AUTO-RTO: 0 /100 WBC (ref 0–0)
PH UR STRIP.AUTO: 6 [PH] (ref 5–8)
PHOSPHATE SERPL-MCNC: 3.1 MG/DL (ref 2.5–4.5)
PLATELET # BLD AUTO: 168 10*3/MM3 (ref 140–450)
PMV BLD AUTO: 10.2 FL (ref 6–12)
POTASSIUM BLD-SCNC: 4.7 MMOL/L (ref 3.5–5.2)
PROT UR QL STRIP: NEGATIVE
PROT UR-MCNC: 5 MG/DL
PROT/CREAT UR: 55.6 MG/G CREA (ref 0–200)
PTH-INTACT SERPL-MCNC: 56.7 PG/ML (ref 15–65)
RBC # BLD AUTO: 4.81 10*6/MM3 (ref 4.14–5.8)
SODIUM BLD-SCNC: 135 MMOL/L (ref 136–145)
SP GR UR STRIP: 1.02 (ref 1–1.03)
UROBILINOGEN UR QL STRIP: NORMAL
WBC NRBC COR # BLD: 6.31 10*3/MM3 (ref 3.4–10.8)

## 2019-04-17 ENCOUNTER — OFFICE VISIT (OUTPATIENT)
Dept: FAMILY MEDICINE CLINIC | Facility: CLINIC | Age: 64
End: 2019-04-17

## 2019-04-17 VITALS
BODY MASS INDEX: 34.81 KG/M2 | WEIGHT: 221.8 LBS | HEIGHT: 67 IN | HEART RATE: 85 BPM | OXYGEN SATURATION: 99 % | DIASTOLIC BLOOD PRESSURE: 80 MMHG | TEMPERATURE: 98 F | SYSTOLIC BLOOD PRESSURE: 118 MMHG

## 2019-04-17 DIAGNOSIS — R05.9 COUGH: ICD-10-CM

## 2019-04-17 DIAGNOSIS — J30.2 SEASONAL ALLERGIC RHINITIS, UNSPECIFIED TRIGGER: Primary | ICD-10-CM

## 2019-04-17 PROCEDURE — 99213 OFFICE O/P EST LOW 20 MIN: CPT | Performed by: FAMILY MEDICINE

## 2019-04-17 RX ORDER — FLUTICASONE PROPIONATE 50 MCG
2 SPRAY, SUSPENSION (ML) NASAL DAILY
Qty: 1 BOTTLE | Refills: 11 | Status: SHIPPED | OUTPATIENT
Start: 2019-04-17 | End: 2020-04-20

## 2019-04-17 RX ORDER — METHYLPREDNISOLONE 4 MG/1
TABLET ORAL
Qty: 21 TABLET | Refills: 0 | Status: SHIPPED | OUTPATIENT
Start: 2019-04-17 | End: 2019-09-17

## 2019-04-17 NOTE — PROGRESS NOTES
" Subjective   Jimmie Ortega is a 64 y.o. male.     History of Present Illness     4-5 days, ha, little cough, sinus, sore throat.  Sinus stuffiness keeps up at night.   Taking claritin.      Review of Systems   Constitutional: Negative for chills, fatigue and fever.   HENT: Positive for congestion and sore throat. Negative for ear discharge, ear pain, facial swelling, hearing loss, postnasal drip, rhinorrhea, sinus pressure, trouble swallowing and voice change.    Eyes: Negative for discharge, redness and visual disturbance.   Respiratory: Positive for cough. Negative for chest tightness, shortness of breath and wheezing.    Cardiovascular: Negative for chest pain and palpitations.   Gastrointestinal: Negative for abdominal pain, blood in stool, constipation, diarrhea, nausea and vomiting.   Endocrine: Negative for polydipsia and polyuria.   Genitourinary: Negative for dysuria, flank pain, hematuria and urgency.   Musculoskeletal: Negative for arthralgias, back pain, joint swelling and myalgias.   Skin: Negative for rash.   Neurological: Negative for dizziness, weakness, numbness and headaches.   Hematological: Negative for adenopathy.   Psychiatric/Behavioral: Positive for sleep disturbance. Negative for confusion. The patient is not nervous/anxious.            /80 (BP Location: Left arm, Patient Position: Sitting, Cuff Size: Adult)   Pulse 85   Temp 98 °F (36.7 °C)   Ht 170.2 cm (67.01\")   Wt 101 kg (221 lb 12.8 oz)   SpO2 99%   BMI 34.73 kg/m²       Objective     Physical Exam   Constitutional: He is oriented to person, place, and time. He appears well-developed and well-nourished.   HENT:   Head: Normocephalic and atraumatic.   Right Ear: External ear normal.   Left Ear: External ear normal.   Nose: Nose normal.   Mouth/Throat: Oropharynx is clear and moist.   Eyes: Conjunctivae and EOM are normal. Pupils are equal, round, and reactive to light.   Neck: Normal range of motion. Neck supple. "   Cardiovascular: Normal rate, regular rhythm and normal heart sounds. Exam reveals no gallop and no friction rub.   No murmur heard.  Pulmonary/Chest: Effort normal and breath sounds normal.   Abdominal: Soft. Bowel sounds are normal. He exhibits no distension. There is no tenderness. There is no rebound and no guarding.   Musculoskeletal: Normal range of motion. He exhibits no edema or deformity.   Neurological: He is alert and oriented to person, place, and time. No cranial nerve deficit.   Skin: Skin is warm and dry. No rash noted. No erythema.   Psychiatric: He has a normal mood and affect. His behavior is normal. Judgment and thought content normal.   Nursing note and vitals reviewed.          PAST MEDICAL HISTORY     Past Medical History:   Diagnosis Date   • Cardiac disease    • Cerebral palsy (CMS/HCC)    • Renal cancer (CMS/HCC)       PAST SURGICAL HISTORY     Past Surgical History:   Procedure Laterality Date   • APPENDECTOMY     • CARDIAC CATHETERIZATION     • NEPHRECTOMY     • TENDON REPAIR        SOCIAL HISTORY     Social History     Socioeconomic History   • Marital status:      Spouse name: Not on file   • Number of children: Not on file   • Years of education: Not on file   • Highest education level: Not on file   Tobacco Use   • Smoking status: Never Smoker   • Smokeless tobacco: Never Used   Substance and Sexual Activity   • Alcohol use: Yes     Comment: occasional   • Drug use: No   • Sexual activity: Defer      ALLERGIES   Morphine   MEDICATIONS     Current Outpatient Medications   Medication Sig Dispense Refill   • albuterol 108 (90 Base) MCG/ACT inhaler Inhale 2 puffs Every 4 (Four) Hours As Needed for Wheezing. 1 inhaler 11   • aspirin 81 MG EC tablet Take 81 mg by mouth Daily.     • atorvastatin (LIPITOR) 20 MG tablet TAKE 1 TABLET(20 MG) BY MOUTH EVERY NIGHT 90 tablet 0   • isosorbide mononitrate (IMDUR) 30 MG 24 hr tablet Take 30 mg by mouth Daily.     •  lisinopril-hydrochlorothiazide (PRINZIDE,ZESTORETIC) 20-12.5 MG per tablet TAKE 1 TABLET BY MOUTH DAILY 90 tablet 3   • metoprolol succinate XL (TOPROL-XL) 25 MG 24 hr tablet Take 25 mg by mouth Daily.     • cyproheptadine 2 MG/5ML syrup Take 5-10 mL by mouth Every 8 (Eight) Hours. 240 mL 12   • fluticasone (FLONASE) 50 MCG/ACT nasal spray 2 sprays into the nostril(s) as directed by provider Daily. 1 bottle 11   • methylPREDNISolone (MEDROL, CHRIS,) 4 MG tablet Take as directed on package instructions. 21 tablet 0     No current facility-administered medications for this visit.         The following portions of the patient's history were reviewed and updated as appropriate: allergies, current medications, past family history, past medical history, past social history, past surgical history and problem list.        Assessment/Plan   Jimmie was seen today for sore throat and nasal congestion.    Diagnoses and all orders for this visit:    Seasonal allergic rhinitis, unspecified trigger    Cough    Other orders  -     methylPREDNISolone (MEDROL, CHRIS,) 4 MG tablet; Take as directed on package instructions.  -     fluticasone (FLONASE) 50 MCG/ACT nasal spray; 2 sprays into the nostril(s) as directed by provider Daily.                       No Follow-up on file.                  This document has been electronically signed by Jimmie Mathew MD on April 17, 2019 11:30 AM

## 2019-04-25 ENCOUNTER — TELEPHONE (OUTPATIENT)
Dept: ORTHOPEDIC SURGERY | Facility: CLINIC | Age: 64
End: 2019-04-25

## 2019-06-12 ENCOUNTER — OFFICE VISIT (OUTPATIENT)
Dept: ORTHOPEDIC SURGERY | Facility: CLINIC | Age: 64
End: 2019-06-12

## 2019-06-12 VITALS — BODY MASS INDEX: 35.16 KG/M2 | HEIGHT: 67 IN | WEIGHT: 224 LBS

## 2019-06-12 DIAGNOSIS — M19.132 SCAPHOID NON-UNION ADVANCED COLLAPSE OF LEFT WRIST: ICD-10-CM

## 2019-06-12 DIAGNOSIS — S62.002S SCAPHOID NON-UNION ADVANCED COLLAPSE OF LEFT WRIST: ICD-10-CM

## 2019-06-12 DIAGNOSIS — S62.022K CLOSED DISPLACED FRACTURE OF MIDDLE THIRD OF SCAPHOID OF LEFT WRIST WITH NONUNION, SUBSEQUENT ENCOUNTER: Primary | ICD-10-CM

## 2019-06-12 DIAGNOSIS — M25.532 LEFT WRIST PAIN: ICD-10-CM

## 2019-06-12 PROCEDURE — 99213 OFFICE O/P EST LOW 20 MIN: CPT | Performed by: ORTHOPAEDIC SURGERY

## 2019-06-12 NOTE — PROGRESS NOTES
Jimmie Ortega is a 64 y.o. male returns for     Chief Complaint   Patient presents with   • Left Wrist - Pain       HISTORY OF PRESENT ILLNESS: Patient is here today for left wrist pain. Patient states that his pain is 3/10.  Is known scaphoid unit nonunion has become increasingly more symptomatic to him.  Certainly this is his essentially nonfunctional hand that mirrors images of his right side.       CONCURRENT MEDICAL HISTORY:    Past Medical History:   Diagnosis Date   • Cardiac disease    • Cerebral palsy (CMS/HCC)    • Renal cancer (CMS/HCC)        Allergies   Allergen Reactions   • Morphine Nausea Only         Current Outpatient Medications:   •  albuterol 108 (90 Base) MCG/ACT inhaler, Inhale 2 puffs Every 4 (Four) Hours As Needed for Wheezing., Disp: 1 inhaler, Rfl: 11  •  aspirin 81 MG EC tablet, Take 81 mg by mouth Daily., Disp: , Rfl:   •  atorvastatin (LIPITOR) 20 MG tablet, TAKE 1 TABLET(20 MG) BY MOUTH EVERY NIGHT, Disp: 90 tablet, Rfl: 0  •  cyproheptadine 2 MG/5ML syrup, Take 5-10 mL by mouth Every 8 (Eight) Hours., Disp: 240 mL, Rfl: 12  •  fluticasone (FLONASE) 50 MCG/ACT nasal spray, 2 sprays into the nostril(s) as directed by provider Daily., Disp: 1 bottle, Rfl: 11  •  isosorbide mononitrate (IMDUR) 30 MG 24 hr tablet, Take 30 mg by mouth Daily., Disp: , Rfl:   •  lisinopril-hydrochlorothiazide (PRINZIDE,ZESTORETIC) 20-12.5 MG per tablet, TAKE 1 TABLET BY MOUTH DAILY, Disp: 90 tablet, Rfl: 3  •  methylPREDNISolone (MEDROL, CHRIS,) 4 MG tablet, Take as directed on package instructions., Disp: 21 tablet, Rfl: 0  •  metoprolol succinate XL (TOPROL-XL) 25 MG 24 hr tablet, Take 25 mg by mouth Daily., Disp: , Rfl:     Past Surgical History:   Procedure Laterality Date   • APPENDECTOMY     • CARDIAC CATHETERIZATION     • NEPHRECTOMY     • TENDON REPAIR             ROS: Review of systems has been updated as of today's date.  All other systems are negative except as noted previously.    PHYSICAL  "EXAMINATION:       Ht 170.2 cm (67\")   Wt 102 kg (224 lb)   BMI 35.08 kg/m²     Physical Exam   Constitutional: He is oriented to person, place, and time. He appears well-developed.   HENT:   Head: Normocephalic and atraumatic.   Eyes: EOM are normal. Pupils are equal, round, and reactive to light.   Neck: Neck supple. No tracheal deviation present.   Pulmonary/Chest: Effort normal.   Musculoskeletal: He exhibits edema, tenderness and deformity.   Neurological: He is alert and oriented to person, place, and time.   Skin: Skin is warm and dry. No erythema.   Psychiatric: He has a normal mood and affect.       GAIT:     [x]  Normal  []  Antalgic    Assistive device: []  None  []  Walker     []  Crutches  []  Cane     []  Wheelchair  []  Stretcher    Ortho Exam  Tender the scaphoid snuffbox.  Decreased motion as before limited voluntary motion.  Mostly neurovascular intact  No results found.    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  I will refer him to the hand guys.  It may be benefit from a proximal row carpectomy discussed this with him I think this is best left up toward the hand guys.  I will hold off on ordering any CT scan I let them order that as they see needed.  No Follow-up on file.      This document has been electronically signed by Kvng Mann MD on June 12, 2019 5:30 PM    "

## 2019-06-25 RX ORDER — ISOSORBIDE MONONITRATE 30 MG/1
TABLET, EXTENDED RELEASE ORAL
Qty: 90 TABLET | Refills: 2 | Status: SHIPPED | OUTPATIENT
Start: 2019-06-25 | End: 2020-03-16

## 2019-06-25 RX ORDER — METOPROLOL SUCCINATE 25 MG/1
TABLET, EXTENDED RELEASE ORAL
Qty: 90 TABLET | Refills: 2 | Status: SHIPPED | OUTPATIENT
Start: 2019-06-25 | End: 2020-06-16

## 2019-08-01 ENCOUNTER — TRANSCRIBE ORDERS (OUTPATIENT)
Dept: PHYSICAL THERAPY | Facility: HOSPITAL | Age: 64
End: 2019-08-01

## 2019-08-01 DIAGNOSIS — M54.6 THORACIC BACK PAIN, UNSPECIFIED BACK PAIN LATERALITY, UNSPECIFIED CHRONICITY: ICD-10-CM

## 2019-08-01 DIAGNOSIS — M25.512 LEFT SHOULDER PAIN, UNSPECIFIED CHRONICITY: Primary | ICD-10-CM

## 2019-08-08 ENCOUNTER — HOSPITAL ENCOUNTER (OUTPATIENT)
Dept: PHYSICAL THERAPY | Facility: HOSPITAL | Age: 64
Setting detail: THERAPIES SERIES
Discharge: HOME | End: 2019-08-08

## 2019-08-08 DIAGNOSIS — G89.29 CHRONIC LEFT SHOULDER PAIN: Primary | ICD-10-CM

## 2019-08-08 DIAGNOSIS — M25.512 CHRONIC LEFT SHOULDER PAIN: Primary | ICD-10-CM

## 2019-08-08 DIAGNOSIS — M54.6 LEFT-SIDED THORACIC BACK PAIN, UNSPECIFIED CHRONICITY: ICD-10-CM

## 2019-08-08 PROCEDURE — 97140 MANUAL THERAPY 1/> REGIONS: CPT | Performed by: PHYSICAL THERAPIST

## 2019-08-08 PROCEDURE — 97162 PT EVAL MOD COMPLEX 30 MIN: CPT | Performed by: PHYSICAL THERAPIST

## 2019-08-08 NOTE — THERAPY EVALUATION
Outpatient Physical Therapy Ortho Initial Evaluation  [facility]     Patient Name: Jimmie Ortega  : 1955  MRN: [id number]  Today's Date: 2019 medicare guidelines.  MD 3 weeks, Recert 19  Visit Date: 2019      Subjective Evaluation    History of Present Illness  Onset date: 2019.    Subjective comment: wrist surgery with cast and sling that pulled on neck and shoulder  Patient Occupation:  Quality of life: good    Social Support  Lives in: one-story house  Lives with: spouse    Diagnostic Tests  X-ray: abnormal    Treatments  Previous treatment: immobilization  Current treatment: immobilization  Patient Goals  Patient goals for therapy: decreased pain, increased motion and increased strength          Patient Active Problem List   Diagnosis   • Left wrist pain   • Closed displaced fracture of middle third of navicular bone of left wrist   • Trigger index finger of right hand   • Coronary artery disease involving native coronary artery of native heart without angina pectoris   • Mixed hyperlipidemia   • Lumbar spondylosis   • Low back pain   • Spondylosis of cervical region without myelopathy or radiculopathy   • Lumbar spondylolysis        Past Medical History:   Diagnosis Date   • Cardiac disease    • Cerebral palsy (CMS/HCC)    • Renal cancer (CMS/HCC)         Past Surgical History:   Procedure Laterality Date   • APPENDECTOMY     • CARDIAC CATHETERIZATION     • NEPHRECTOMY     • TENDON REPAIR       Medications (Admitted on 2019)    albuterol 108 (90 Base) MCG/ACT inhaler    aspirin 81 MG EC tablet    atorvastatin (LIPITOR) 20 MG tablet    cyproheptadine 2 MG/5ML syrup    fluticasone (FLONASE) 50 MCG/ACT nasal spray    isosorbide mononitrate (IMDUR) 30 MG 24 hr tablet    lisinopril-hydrochlorothiazide (PRINZIDE,ZESTORETIC) 20-12.5 MG per tablet    methylPREDNISolone (MEDROL, CHRIS,) 4 MG tablet    metoprolol succinate      ALLERGIES: morphine  Visit Dx:      ICD-10-CM ICD-9-CM   1. Chronic left shoulder pain M25.512 719.41    G89.29 338.29   2. Left-sided thoracic back pain, unspecified chronicity M54.6 724.1           Objective    PT Ortho     Row Name 08/08/19 1500       Precautions and Contraindications    Contraindications  no use of left hand/wrist: no resistance of that extremity  -DD       Subjective Pain    Pre-Treatment Pain Level  3  -DD    Post-Treatment Pain Level  2  -DD       Posture/Observations    Posture/Observations Comments  rounded shoulder forward head posture.  -DD       Special Tests/Palpation    Special Tests/Palpation  -- TTP left medial scapular border, Levator  -DD       General ROM    GENERAL ROM COMMENTS  cervical ext 22, flexion 50, Left shoulder PROM flexion 150, active ext 45, PROM abd 143, ER 70 supine at 90 abd, IR 50  -DD       MMT (Manual Muscle Testing)    General MMT Comments  MD restricts resistance of Left UE at this time, Pt has decreased shoulder strength due to CP known from prior treatment last year  -DD       Sensation    Sensation WNL?  WFL  -DD      User Key  (r) = Recorded By, (t) = Taken By, (c) = Cosigned By    Initials Name Provider Type    Jessica Nix, PT DPT Physical Therapist          Therapy Education  Given: HEP  Program: New  How Provided: Verbal, Written  Provided to: Patient  Level of Understanding: Verbalized, Demonstrated    Assessment/Plan     PT OP Goals     Row Name 08/08/19 1529          PT Short Term Goals    STG Date to Achieve  08/29/19  -DD     STG 1  Pt will be independent in HEP  -DD     STG 2  Pt will have scapulothracic pain of 1-2/10 or less with ADLs  -DD     STG 3  Pt will be able to report improved sleep.  -DD     STG 4  Pt will have Quick dash score of 20% or less  -DD     STG 5  Pt will tolerate MMT of shoulder flexion 4  -DD        Long Term Goals    LTG 1  Pt will tolerate MMT of shoulder abd 4/5  -DD        Time Calculation    PT Goal Re-Cert Due Date  08/29/19  -DD        User Key  (r) = Recorded By, (t) = Taken By, (c) = Cosigned By    Initials Name Provider Type    Jessica Nix PT DPT Physical Therapist          PT Assessment/Plan     Row Name 08/08/19 1609          PT Assessment    Functional Limitations  Limitation in home management;Performance in self-care ADL;Performance in leisure activities  -DD     Impairments  Pain;Muscle strength;Range of motion  -DD     Assessment Comments  Pt has had increase in left scapular/shoulder since wrist surgery were he was in a heavy cast and wore a sling. This left side is his weak side from having CP and he would beneift from Pt for decreasing muscle tension and postural reeducation to prevent strain of the mid back  -DD     Please refer to paper survey for additional self-reported information  Yes  -DD     Rehab Potential  Good  -DD     Patient/caregiver participated in establishment of treatment plan and goals  Yes  -DD     Patient would benefit from skilled therapy intervention  Yes  -DD        PT Plan    Predicted Duration of Therapy Intervention (Therapy Eval)  8 visits  -DD     Planned CPT's?  PT EVAL MOD COMPLELITY: 79685;PT THER PROC EA 15 MIN: 18497;PT THER ACT EA 15 MIN: 45923;PT MANUAL THERAPY EA 15 MIN: 12259  -DD     Physical Therapy Interventions (Optional Details)  manual therapy techniques;home exercise program;postural re-education;stretching;strengthening;ROM (Range of Motion)  -DD     PT Plan Comments  shoulder and scapular strengthening, start with isometric add, abd and ext. STM/MFR to UT/Levator and medial scapular border, Joint mobes shoulder and scapular glides  -DD       User Key  (r) = Recorded By, (t) = Taken By, (c) = Cosigned By    Initials Name Provider Type    Jessica Nix PT [id number] Physical Therapist            Exercises     Row Name 08/08/19 1500             Subjective Pain    Pre-Treatment Pain Level  3  -DD      Post-Treatment Pain Level  2  -DD         Exercise 1    Exercise Name  "1  levator stretch  -DD      Reps 1  3/30\"  -DD         Exercise 2    Exercise Name 2  CT  -DD      Reps 2  10  -DD         Exercise 3    Exercise Name 3  scapular squeezes  -DD      Reps 3  20  -DD         Exercise 4    Exercise Name 4  posterior shoulder stretch  -DD      Reps 4  3/30\"  -DD         Exercise 5    Exercise Name 5  cues for postural position of back and shoulders  -DD        User Key  (r) = Recorded By, (t) = Taken By, (c) = Cosigned By    Initials Name Provider Type    DD Jessica Frye, PT DPT Physical Therapist           Outcome Measure Options: Quick DASH  Quick DASH  Open a tight or new jar.: Moderate Difficulty  Do heavy household chores (e.g., wash walls, wash floors): No Difficulty  Carry a shopping bag or briefcase: No Difficulty  Wash your back: No Difficulty  Use a knife to cut food: No Difficulty  Recreational activities in which you take some force or impact through your arm, should or hand (e.g. golf, hammering, tennis, etc.): Severe Difficulty  During the past week, to what extent has your arm, shoulder, or hand problem interfered with your normal social activites with family, friends, neighbors or groups?: Moderately  During the past week, were you limited in your work or other regular daily activities as a result of your arm, shoulder or hand problem?: Moderately Limited  Arm, Shoulder, or hand pain: Moderate  Tingling (pins and needles) in your arm, shoulder, or hand: None  During the past week, how much difficulty have you had sleeping because of the pain in your arm, shoulder or hand?: Mild Difficulty  Number of Questions Answered: 11  Quick DASH Score: 27.27     Time Calculation:     Start Time: 1430  Stop Time: 1515  Time Calculation (min): 45 min  Total Timed Code Minutes- PT: 20 minute(s)     Therapy Charges for Today     Code Description Service Date Service Provider Modifiers Qty    05994250853 HC PT MANUAL THERAPY EA 15 MIN 8/8/2019 Jessica Frye, PT DPT GP " 1    [id number]  PT EVAL MOD COMPLEXITY 2 8/8/2019 Jessica Frye, PT DPT GP 1          PT G-Codes  Outcome Measure Options: Quick DASH  Quick DASH Score: 27.27       Jessica Frye, PT DPT  8/8/2019

## 2019-08-12 ENCOUNTER — HOSPITAL ENCOUNTER (OUTPATIENT)
Dept: PHYSICAL THERAPY | Facility: HOSPITAL | Age: 64
Setting detail: THERAPIES SERIES
Discharge: HOME | End: 2019-08-12

## 2019-08-12 DIAGNOSIS — M54.6 LEFT-SIDED THORACIC BACK PAIN, UNSPECIFIED CHRONICITY: ICD-10-CM

## 2019-08-12 DIAGNOSIS — G89.29 CHRONIC LEFT SHOULDER PAIN: Primary | ICD-10-CM

## 2019-08-12 DIAGNOSIS — M25.512 CHRONIC LEFT SHOULDER PAIN: Primary | ICD-10-CM

## 2019-08-12 PROCEDURE — 97110 THERAPEUTIC EXERCISES: CPT

## 2019-08-12 PROCEDURE — 97140 MANUAL THERAPY 1/> REGIONS: CPT

## 2019-08-12 NOTE — THERAPY TREATMENT NOTE
Outpatient Physical Therapy Ortho TREATMENT NOTE   David Azar     Patient Name: Jimmie Ortega  : 1955  MRN: [id number]  Today's Date: 2019      Visit Date: 2019      Subjective Improvement:     NA  Attendance:    Approved:     Per Medicare       MD follow up:    3 weeks        date:     19    Subjective    Patient Active Problem List   Diagnosis   • Left wrist pain   • Closed displaced fracture of middle third of navicular bone of left wrist   • Trigger index finger of right hand   • Coronary artery disease involving native coronary artery of native heart without angina pectoris   • Mixed hyperlipidemia   • Lumbar spondylosis   • Low back pain   • Spondylosis of cervical region without myelopathy or radiculopathy   • Lumbar spondylolysis        Past Medical History:   Diagnosis Date   • Cardiac disease    • Cerebral palsy (CMS/HCC)    • Renal cancer (CMS/HCC)         Past Surgical History:   Procedure Laterality Date   • APPENDECTOMY     • CARDIAC CATHETERIZATION     • NEPHRECTOMY     • TENDON REPAIR         Visit Dx:     ICD-10-CM ICD-9-CM   1. Chronic left shoulder pain M25.512 719.41    G89.29 338.29   2. Left-sided thoracic back pain, unspecified chronicity M54.6 724.1           Objective    PT Ortho     Row Name 19 1400       Precautions and Contraindications    Contraindications  no use of left hand/wrist: no resistance of that extremity  -TM       Subjective Pain    Able to rate subjective pain?  yes  -TM       Posture/Observations    Posture/Observations Comments  rounded shoulder posture, splint L wrist  -TM      User Key  (r) = Recorded By, (t) = Taken By, (c) = Cosigned By    Initials Name Provider Type    Rowan Armando, PTA Physical Therapy Assistant                           Assessment/Plan     PT OP Goals     Row Name 19 1400          PT Short Term Goals    STG Date to Achieve  19  -TM     STG 1  Pt will be independent in HEP   "-TM     STG 1 Progress  Ongoing  -TM     STG 2  Pt will have scapulothracic pain of 1-2/10 or less with ADLs  -TM     STG 2 Progress  Ongoing  -TM     STG 3  Pt will be able to report improved sleep.  -TM     STG 3 Progress  Ongoing  -TM     STG 4  Pt will have Quick dash score of 20% or less  -TM     STG 4 Progress  Ongoing  -TM     STG 5  Pt will tolerate MMT of shoulder flexion 4  -TM     STG 5 Progress  Ongoing  -TM        Long Term Goals    LTG 1  Pt will tolerate MMT of shoulder abd 4/5  -TM     LTG 1 Progress  Ongoing  -TM       User Key  (r) = Recorded By, (t) = Taken By, (c) = Cosigned By    Initials Name Provider Type     Rowan Hdz, TANVIR Physical Therapy Assistant          PT Assessment/Plan     Row Name 08/12/19 1400          PT Assessment    Assessment Comments  Pt able to complete therex without pain increase.  Reported decreased pain post manual RX.   -TM     Patient/caregiver participated in establishment of treatment plan and goals  Yes  -TM        PT Plan    Predicted Duration of Therapy Intervention (Therapy Eval)  8 visits  -TM     PT Plan Comments  Add isometrics to HEP.    -TM       User Key  (r) = Recorded By, (t) = Taken By, (c) = Cosigned By    Initials Name Provider Type     Rowan Hdz, TANVIR Physical Therapy Assistant            Exercises     Row Name 08/12/19 1400             Subjective Comments    Subjective Comments  Pt reports sleeping is still his biggest problem.  Can't get comfortable.   -TM         Subjective Pain    Able to rate subjective pain?  yes  -TM      Pre-Treatment Pain Level  3  -TM      Post-Treatment Pain Level  1  -TM         Exercise 1    Exercise Name 1  levator stretch  -TM      Sets 1  3  -TM      Time 1  30\"  -TM         Exercise 2    Exercise Name 2  chin tucks  -TM      Reps 2  10  -TM         Exercise 3    Exercise Name 3  posterior shoulder stretch  -TM      Sets 3  3  -TM      Time 3  30\"  -TM         Exercise 4    Exercise Name 4  scap " "squeezes  -TM      Sets 4  2  -TM      Reps 4  10  -TM         Exercise 5    Exercise Name 5  isometric shoulder ext, Abd, Add  -TM      Reps 5  10  -TM      Time 5  5\"  -TM         Exercise 6    Exercise Name 6  manual: see flowsheet  -TM        User Key  (r) = Recorded By, (t) = Taken By, (c) = Cosigned By    Initials Name Provider Type    TM Rowan Hdz PTA Physical Therapy Assistant           Manual Rx (last 36 hours)      Manual Treatments     Row Name 08/12/19 1500             Manual Rx 1    Manual Rx 1 Location  UT, levator, medial scap border  -TM      Manual Rx 1 Type  STM/MFR  -TM         Manual Rx 2    Manual Rx 2 Location  L shoulder   -TM      Manual Rx 2 Type  joint mobes, scap glides  -TM        User Key  (r) = Recorded By, (t) = Taken By, (c) = Cosigned By    Initials Name Provider Type    TM Rowan Hdz PTA Physical Therapy Assistant                                Time Calculation:     Start Time: 1440  Stop Time: 1520  Time Calculation (min): 40 min  Total Timed Code Minutes- PT: 40 minute(s)     Therapy Charges for Today     Code Description Service Date Service Provider Modifiers Qty    25367490337 HC PT THER PROC EA 15 MIN 8/12/2019 Rowan Hdz, TANVIR GP 2    75834959115 HC PT MANUAL THERAPY EA 15 MIN 8/12/2019 Rowan Hdz PTA GP 1                    Rowan Hdz PTA  8/12/2019  "

## 2019-08-15 ENCOUNTER — HOSPITAL ENCOUNTER (OUTPATIENT)
Dept: PHYSICAL THERAPY | Facility: HOSPITAL | Age: 64
Setting detail: THERAPIES SERIES
Discharge: HOME | End: 2019-08-15

## 2019-08-15 DIAGNOSIS — M25.512 CHRONIC LEFT SHOULDER PAIN: Primary | ICD-10-CM

## 2019-08-15 DIAGNOSIS — M54.6 LEFT-SIDED THORACIC BACK PAIN, UNSPECIFIED CHRONICITY: ICD-10-CM

## 2019-08-15 DIAGNOSIS — G89.29 CHRONIC LEFT SHOULDER PAIN: Primary | ICD-10-CM

## 2019-08-15 PROCEDURE — 97110 THERAPEUTIC EXERCISES: CPT | Performed by: PHYSICAL THERAPIST

## 2019-08-15 PROCEDURE — 97140 MANUAL THERAPY 1/> REGIONS: CPT | Performed by: PHYSICAL THERAPIST

## 2019-08-15 NOTE — THERAPY TREATMENT NOTE
Outpatient Physical Therapy Ortho Treatment Note  [facility]     Patient Name: Jimmie Ortega  : 1955  MRN: [id number]  Today's Date: 8/15/2019      Visit Date: 08/15/2019     Subjective Improvement:     NA  Attendance:  3/3  Approved:     Per Medicare       MD follow up:    3 weeks        date:     19      Visit Dx:    ICD-10-CM ICD-9-CM   1. Chronic left shoulder pain M25.512 719.41    G89.29 338.29   2. Left-sided thoracic back pain, unspecified chronicity M54.6 724.1       Patient Active Problem List   Diagnosis   • Left wrist pain   • Closed displaced fracture of middle third of navicular bone of left wrist   • Trigger index finger of right hand   • Coronary artery disease involving native coronary artery of native heart without angina pectoris   • Mixed hyperlipidemia   • Lumbar spondylosis   • Low back pain   • Spondylosis of cervical region without myelopathy or radiculopathy   • Lumbar spondylolysis        Past Medical History:   Diagnosis Date   • Cardiac disease    • Cerebral palsy (CMS/HCC)    • Renal cancer (CMS/HCC)         Past Surgical History:   Procedure Laterality Date   • APPENDECTOMY     • CARDIAC CATHETERIZATION     • NEPHRECTOMY     • TENDON REPAIR           PT Assessment/Plan     Row Name 08/15/19 1553          PT Assessment    Assessment Comments  modification of some exercises and addition of towel roll stretch and use of tennis ball were beneificial today. ALso educated pt to sleep with pillow under axilla on left, which he found comfortable.  -DD     Please refer to paper survey for additional self-reported information  Yes  -DD     Rehab Potential  Good  -DD     Patient/caregiver participated in establishment of treatment plan and goals  Yes  -DD     Patient would benefit from skilled therapy intervention  Yes  -DD        PT Plan    Predicted Duration of Therapy Intervention (Therapy Eval)  8 visits  -DD     PT Plan Comments  add isometrics to HEP, Tband  "yellow to HEP  -DD       User Key  (r) = Recorded By, (t) = Taken By, (c) = Cosigned By    Initials Name Provider Type    DD Jessica Frye, PT DPT Physical Therapist            Exercises     Row Name 08/15/19 1425             Subjective Comments    Subjective Comments  just not comfortable to sleep  -DD         Subjective Pain    Able to rate subjective pain?  yes  -DD      Pre-Treatment Pain Level  2  -DD      Post-Treatment Pain Level  0  -DD         Exercise 1    Exercise Name 1  levator stretch  -DD      Cueing 1  Tactile;Verbal  -DD      Sets 1  3  -DD      Time 1  30\"  -DD         Exercise 2    Exercise Name 2  supine towel pec stretch  -DD      Reps 2  --  -DD      Time 2  5  -DD      Additional Comments  with manual to levator  -DD         Exercise 3    Exercise Name 3  posterior shoulder stretch  -DD      Sets 3  --  -DD      Reps 3  3  -DD      Time 3  30\"  -DD      Additional Comments  different angles  -DD         Exercise 4    Exercise Name 4  scap squeezes  -DD      Sets 4  2  -DD      Reps 4  10  -DD         Exercise 5    Exercise Name 5  shoulder band ext  -DD      Sets 5  2  -DD      Reps 5  10  -DD      Time 5  --  -DD      Additional Comments  yellow tied to forearm  -DD         Exercise 6    Exercise Name 6  shoulder horz abd  -DD      Reps 6  20  -DD      Additional Comments  yellow  -DD         Exercise 7    Exercise Name 7  tennis ball self release  -DD      Time 7  2'  -DD        User Key  (r) = Recorded By, (t) = Taken By, (c) = Cosigned By    Initials Name Provider Type    DD Jessica Frye, PT DPT Physical Therapist                      Manual Rx (last 36 hours)      Manual Treatments     Row Name 08/15/19 1425             Manual Rx 1    Manual Rx 1 Location  UT, levator, medial scap border  -DD      Manual Rx 1 Type  STM/MFR  -DD         Manual Rx 2    Manual Rx 2 Location  L shoulder   -DD      Manual Rx 2 Type  joint mobes, scap glides  -DD        User Key  (r) = " Recorded By, (t) = Taken By, (c) = Cosigned By    Initials Name Provider Type    Jessica Nix, PT DPT Physical Therapist          PT OP Goals     Row Name 08/15/19 1425          PT Short Term Goals    STG Date to Achieve  08/29/19  -DD     STG 1  Pt will be independent in HEP  -DD     STG 1 Progress  Ongoing  -DD     STG 2  Pt will have scapulothracic pain of 1-2/10 or less with ADLs  -DD     STG 2 Progress  Ongoing  -DD     STG 3  Pt will be able to report improved sleep.  -DD     STG 3 Progress  Ongoing  -DD     STG 4  Pt will have Quick dash score of 20% or less  -DD     STG 4 Progress  Ongoing  -DD     STG 5  Pt will tolerate MMT of shoulder flexion 4  -DD     STG 5 Progress  Ongoing  -DD        Long Term Goals    LTG 1  Pt will tolerate MMT of shoulder abd 4/5  -DD     LTG 1 Progress  Ongoing  -DD       User Key  (r) = Recorded By, (t) = Taken By, (c) = Cosigned By    Initials Name Provider Type    Jessica Nix, PT DPT Physical Therapist          Time Calculation:   Start Time: 1425  Stop Time: 1505  Time Calculation (min): 40 min  Total Timed Code Minutes- PT: 40 minute(s)  Therapy Charges for Today     Code Description Service Date Service Provider Modifiers Qty    06323025770 HC PT MANUAL THERAPY EA 15 MIN 8/15/2019 Jessica Frye, PT DPT GP 1    23371651692 HC PT THER PROC EA 15 MIN 8/15/2019 Jessica Frye, PT DPT GP 2                    Jessica Frye PT DPT  8/15/2019

## 2019-08-20 ENCOUNTER — HOSPITAL ENCOUNTER (OUTPATIENT)
Dept: PHYSICAL THERAPY | Facility: HOSPITAL | Age: 64
Setting detail: THERAPIES SERIES
Discharge: HOME | End: 2019-08-20

## 2019-08-20 DIAGNOSIS — G89.29 CHRONIC LEFT SHOULDER PAIN: Primary | ICD-10-CM

## 2019-08-20 DIAGNOSIS — M25.512 CHRONIC LEFT SHOULDER PAIN: Primary | ICD-10-CM

## 2019-08-20 DIAGNOSIS — M54.6 LEFT-SIDED THORACIC BACK PAIN, UNSPECIFIED CHRONICITY: ICD-10-CM

## 2019-08-20 PROCEDURE — 97140 MANUAL THERAPY 1/> REGIONS: CPT

## 2019-08-20 PROCEDURE — 97110 THERAPEUTIC EXERCISES: CPT

## 2019-08-20 NOTE — THERAPY TREATMENT NOTE
"Outpatient Physical Therapy Ortho TREATMENT NOTE   David Azar     Patient Name: Jimmie Ortega  : 1955  MRN: [id number]  Today's Date: 2019      Visit Date: 2019      Subjective Improvement:     \"a little\"  Attendance:    Approved:     Per Medicare MD follow up:       3 weeks     date:     19    Subjective    Patient Active Problem List   Diagnosis   • Left wrist pain   • Closed displaced fracture of middle third of navicular bone of left wrist   • Trigger index finger of right hand   • Coronary artery disease involving native coronary artery of native heart without angina pectoris   • Mixed hyperlipidemia   • Lumbar spondylosis   • Low back pain   • Spondylosis of cervical region without myelopathy or radiculopathy   • Lumbar spondylolysis        Past Medical History:   Diagnosis Date   • Cardiac disease    • Cerebral palsy (CMS/HCC)    • Renal cancer (CMS/HCC)         Past Surgical History:   Procedure Laterality Date   • APPENDECTOMY     • CARDIAC CATHETERIZATION     • NEPHRECTOMY     • TENDON REPAIR         Visit Dx:     ICD-10-CM ICD-9-CM   1. Chronic left shoulder pain M25.512 719.41    G89.29 338.29   2. Left-sided thoracic back pain, unspecified chronicity M54.6 724.1           Objective    PT Ortho     Row Name 19 1300       Subjective Comments    Subjective Comments  Pt reports that he has sleeping better.  The tennis ball seems to be helping.   -TM       Subjective Pain    Able to rate subjective pain?  yes  -TM    Pre-Treatment Pain Level  2  -TM    Post-Treatment Pain Level  0  -TM       Posture/Observations    Posture/Observations Comments  splint L wrist  -TM      User Key  (r) = Recorded By, (t) = Taken By, (c) = Cosigned By    Initials Name Provider Type    Rowan Armando, PTA Physical Therapy Assistant                           Assessment/Plan     PT OP Goals     Row Name 19 1300          PT Short Term Goals    STG Date to " "Achieve  08/29/19  -TM     STG 1  Pt will be independent in HEP  -TM     STG 1 Progress  Ongoing  -TM     STG 2  Pt will have scapulothracic pain of 1-2/10 or less with ADLs  -TM     STG 2 Progress  Ongoing  -TM     STG 3  Pt will be able to report improved sleep.  -TM     STG 3 Progress  Progressing  -TM     STG 4  Pt will have Quick dash score of 20% or less  -TM     STG 4 Progress  Ongoing  -TM     STG 5  Pt will tolerate MMT of shoulder flexion 4  -TM     STG 5 Progress  Ongoing  -TM        Long Term Goals    LTG 1  Pt will tolerate MMT of shoulder abd 4/5  -TM     LTG 1 Progress  Ongoing  -TM       User Key  (r) = Recorded By, (t) = Taken By, (c) = Cosigned By    Initials Name Provider Type    Rowan Armando PTA Physical Therapy Assistant          PT Assessment/Plan     Row Name 08/20/19 1300          PT Assessment    Assessment Comments  Pt with reports of improved sleep and decreased pain.  Noted fewer trigger points in L scapular area. Good compliance with HEP.     -TM     Patient/caregiver participated in establishment of treatment plan and goals  Yes  -TM        PT Plan    Predicted Duration of Therapy Intervention (Therapy Eval)  8 visits  -TM     PT Plan Comments  PNF shoulder isometrics  -TM       User Key  (r) = Recorded By, (t) = Taken By, (c) = Cosigned By    Initials Name Provider Type     Rowan Hdz PTA Physical Therapy Assistant            Exercises     Row Name 08/20/19 1300             Subjective Comments    Subjective Comments  Pt reports that he has sleeping better.  The tennis ball seems to be helping.   -TM         Subjective Pain    Able to rate subjective pain?  yes  -TM      Pre-Treatment Pain Level  2  -TM      Post-Treatment Pain Level  0  -TM         Exercise 1    Exercise Name 1  supine towel roll pec stretch  -TM      Time 1  2 min  -TM         Exercise 2    Exercise Name 2  levator stretch  -TM      Sets 2  2  -TM      Time 2  30\"  -TM         Exercise 3    " "Exercise Name 3  posterior shoulder stretch  -TM      Sets 3  2  -TM      Time 3  30\"  -TM         Exercise 4    Exercise Name 4  supine T Band flex, horiz Abd  -TM      Reps 4  20  -TM      Additional Comments  yellow on forearm  -TM         Exercise 5    Exercise Name 5  supine rhythmic stab @ 90° flex  -TM      Sets 5  2  -TM      Time 5  30\"  -TM         Exercise 6    Exercise Name 6  manual: see flowsheet  -TM         Exercise 7    Exercise Name 7  isometric flex, ext, Abd  -TM      Reps 7  10  -TM      Time 7  5\"   -TM      Additional Comments  PTA resist  -TM         Exercise 8    Exercise Name 8  standing T Band shoulder ext  -TM      Reps 8  20  -TM      Additional Comments  yellow  -TM         Exercise 9    Exercise Name 9  scap squeezes  -TM      Reps 9  20  -TM        User Key  (r) = Recorded By, (t) = Taken By, (c) = Cosigned By    Initials Name Provider Type    TM Rowan Hdz PTA Physical Therapy Assistant           Manual Rx (last 36 hours)      Manual Treatments     Row Name 08/20/19 1300             Manual Rx 1    Manual Rx 1 Location  UT, levator, medial scap border  -TM      Manual Rx 1 Type  STM/MFR  -TM         Manual Rx 2    Manual Rx 2 Location  L shoulder   -TM      Manual Rx 2 Type  joint mobes, scap glides  -TM        User Key  (r) = Recorded By, (t) = Taken By, (c) = Cosigned By    Initials Name Provider Type    TM Rowan Hdz PTA Physical Therapy Assistant                                Time Calculation:     Start Time: 1345  Stop Time: 1425  Time Calculation (min): 40 min  Total Timed Code Minutes- PT: 40 minute(s)     Therapy Charges for Today     Code Description Service Date Service Provider Modifiers Qty    81738028043 HC PT THER PROC EA 15 MIN 8/20/2019 Rowan Hdz PTA GP 2    49817735845 HC PT MANUAL THERAPY EA 15 MIN 8/20/2019 Rowan Hdz PTA GP 1                    Rowan Hdz PTA  8/20/2019  "

## 2019-08-27 ENCOUNTER — HOSPITAL ENCOUNTER (OUTPATIENT)
Dept: PHYSICAL THERAPY | Facility: HOSPITAL | Age: 64
Setting detail: THERAPIES SERIES
Discharge: HOME | End: 2019-08-27

## 2019-08-27 DIAGNOSIS — M25.512 CHRONIC LEFT SHOULDER PAIN: Primary | ICD-10-CM

## 2019-08-27 DIAGNOSIS — M54.6 LEFT-SIDED THORACIC BACK PAIN, UNSPECIFIED CHRONICITY: ICD-10-CM

## 2019-08-27 DIAGNOSIS — G89.29 CHRONIC LEFT SHOULDER PAIN: Primary | ICD-10-CM

## 2019-08-27 PROCEDURE — 97110 THERAPEUTIC EXERCISES: CPT

## 2019-08-27 NOTE — THERAPY TREATMENT NOTE
Outpatient Physical Therapy Ortho TREATMENT NOTE   David Azar     Patient Name: Jimmie Ortega  : 1955  MRN: [id number]  Today's Date: 2019      Visit Date: 2019    Subjective Improvement:     30%  Attendance:    Approved:       Per Medicare    MD follow up:    3 weeks        date:     19    Subjective    Patient Active Problem List   Diagnosis   • Left wrist pain   • Closed displaced fracture of middle third of navicular bone of left wrist   • Trigger index finger of right hand   • Coronary artery disease involving native coronary artery of native heart without angina pectoris   • Mixed hyperlipidemia   • Lumbar spondylosis   • Low back pain   • Spondylosis of cervical region without myelopathy or radiculopathy   • Lumbar spondylolysis        Past Medical History:   Diagnosis Date   • Cardiac disease    • Cerebral palsy (CMS/HCC)    • Renal cancer (CMS/HCC)         Past Surgical History:   Procedure Laterality Date   • APPENDECTOMY     • CARDIAC CATHETERIZATION     • NEPHRECTOMY     • TENDON REPAIR         Visit Dx:     ICD-10-CM ICD-9-CM   1. Chronic left shoulder pain M25.512 719.41    G89.29 338.29   2. Left-sided thoracic back pain, unspecified chronicity M54.6 724.1           Objective    PT Ortho     Row Name 19 1400       Subjective Comments    Subjective Comments  Pt reports that he is getting better.  Having less shoulder pain.   -TM       Subjective Pain    Able to rate subjective pain?  yes  -TM    Post-Treatment Pain Level  0  -TM       Posture/Observations    Posture/Observations Comments  splint L wrist  -TM       MMT (Manual Muscle Testing)    General MMT Comments  L shoulder   -TM      User Key  (r) = Recorded By, (t) = Taken By, (c) = Cosigned By    Initials Name Provider Type    Rowan Armando, PTA Physical Therapy Assistant                           Assessment/Plan     PT OP Goals     Row Name 19 1400          PT Short Term Goals  "   STG Date to Achieve  08/29/19  -TM     STG 1  Pt will be independent in HEP  -TM     STG 1 Progress  Ongoing  -TM     STG 2  Pt will have scapulothracic pain of 1-2/10 or less with ADLs  -TM     STG 2 Progress  Ongoing  -TM     STG 3  Pt will be able to report improved sleep.  -TM     STG 3 Progress  Progressing  -TM     STG 4  Pt will have Quick dash score of 20% or less  -TM     STG 4 Progress  Ongoing  -TM     STG 5  Pt will tolerate MMT of shoulder flexion 4  -TM     STG 5 Progress  Met  -TM        Long Term Goals    LTG 1  Pt will tolerate MMT of shoulder abd 4/5  -TM     LTG 1 Progress  Met  -TM       User Key  (r) = Recorded By, (t) = Taken By, (c) = Cosigned By    Initials Name Provider Type     Rowan Hdz PTA Physical Therapy Assistant          PT Assessment/Plan     Row Name 08/27/19 1400          PT Assessment    Assessment Comments  Pt showing improvement in L shoulder ROM & strength.  Still no resistance at the L wrist.    -TM        PT Plan    Predicted Duration of Therapy Intervention (Therapy Eval)  8 visits  -TM     PT Plan Comments  MD followup tomorrow.  Note sent with pt.   -TM       User Key  (r) = Recorded By, (t) = Taken By, (c) = Cosigned By    Initials Name Provider Type     Rowan Hdz PTA Physical Therapy Assistant            Exercises     Row Name 08/27/19 1400             Subjective Comments    Subjective Comments  Pt reports that he is getting better.  Having less shoulder pain.   -TM         Subjective Pain    Able to rate subjective pain?  yes  -TM      Pre-Treatment Pain Level  2  -TM      Post-Treatment Pain Level  0  -TM         Exercise 1    Exercise Name 1  levator stretch  -TM      Sets 1  2  -TM      Time 1  30\"  -TM         Exercise 2    Exercise Name 2  posterior shoulder stretch  -TM      Sets 2  2  -TM      Time 2  30\"  -TM         Exercise 3    Exercise Name 3  isometric shoulder flex, ext, Abd   -TM      Reps 3  10  -TM      Time 3  5\"  -TM      " Additional Comments  PTA resist  -TM         Exercise 4    Exercise Name 4  T Band shoulder flex, ext, horiz Abd  -TM      Reps 4  20  -TM      Additional Comments  yellow  -TM         Exercise 5    Exercise Name 5  scap squeezes  -TM      Sets 5  2  -TM      Reps 5  10  -TM         Exercise 6    Exercise Name 6  PNF D1/D2 with PTA resist  -TM      Reps 6  10 each  -TM         Exercise 7    Exercise Name 7  manual: see flowsheet  -TM        User Key  (r) = Recorded By, (t) = Taken By, (c) = Cosigned By    Initials Name Provider Type    TM Rowan Hdz PTA Physical Therapy Assistant           Manual Rx (last 36 hours)      Manual Treatments     Row Name 08/27/19 1500             Manual Rx 1    Manual Rx 1 Location  UT, levator, medial scap border  -TM      Manual Rx 1 Type  STM/MFR  -TM         Manual Rx 2    Manual Rx 2 Location  L shoulder   -TM      Manual Rx 2 Type  joint mobes, scap glides  -TM        User Key  (r) = Recorded By, (t) = Taken By, (c) = Cosigned By    Initials Name Provider Type    TM Rowan Hdz PTA Physical Therapy Assistant                                Time Calculation:     Start Time: 1427  Stop Time: 1515  Time Calculation (min): 48 min  Total Timed Code Minutes- PT: 48 minute(s)     Therapy Charges for Today     Code Description Service Date Service Provider Modifiers Qty    85080352607  PT THER PROC EA 15 MIN 8/27/2019 Rowan Hdz PTA GP 3                    Rowan Hdz PTA  8/27/2019

## 2019-08-29 ENCOUNTER — TRANSCRIBE ORDERS (OUTPATIENT)
Dept: LAB | Facility: HOSPITAL | Age: 64
End: 2019-08-29

## 2019-08-29 ENCOUNTER — APPOINTMENT (OUTPATIENT)
Dept: PHYSICAL THERAPY | Facility: HOSPITAL | Age: 64
End: 2019-08-29

## 2019-08-29 ENCOUNTER — DOCUMENTATION (OUTPATIENT)
Dept: PHYSICAL THERAPY | Facility: HOSPITAL | Age: 64
End: 2019-08-29

## 2019-08-29 DIAGNOSIS — N18.30 CHRONIC KIDNEY DISEASE, STAGE III (MODERATE) (HCC): Primary | ICD-10-CM

## 2019-09-03 RX ORDER — ATORVASTATIN CALCIUM 20 MG/1
TABLET, FILM COATED ORAL
Qty: 90 TABLET | Refills: 1 | Status: SHIPPED | OUTPATIENT
Start: 2019-09-03 | End: 2020-03-17

## 2019-09-17 ENCOUNTER — OFFICE VISIT (OUTPATIENT)
Dept: FAMILY MEDICINE CLINIC | Facility: CLINIC | Age: 64
End: 2019-09-17

## 2019-09-17 VITALS
TEMPERATURE: 97.7 F | BODY MASS INDEX: 35.53 KG/M2 | SYSTOLIC BLOOD PRESSURE: 100 MMHG | OXYGEN SATURATION: 95 % | HEIGHT: 67 IN | HEART RATE: 85 BPM | DIASTOLIC BLOOD PRESSURE: 62 MMHG | WEIGHT: 226.4 LBS

## 2019-09-17 DIAGNOSIS — J30.2 SEASONAL ALLERGIC RHINITIS, UNSPECIFIED TRIGGER: Primary | ICD-10-CM

## 2019-09-17 DIAGNOSIS — R05.9 COUGH: ICD-10-CM

## 2019-09-17 PROCEDURE — 99213 OFFICE O/P EST LOW 20 MIN: CPT | Performed by: FAMILY MEDICINE

## 2019-09-17 PROCEDURE — 96372 THER/PROPH/DIAG INJ SC/IM: CPT | Performed by: FAMILY MEDICINE

## 2019-09-17 RX ORDER — TRIAMCINOLONE ACETONIDE 40 MG/ML
80 INJECTION, SUSPENSION INTRA-ARTICULAR; INTRAMUSCULAR ONCE
Status: COMPLETED | OUTPATIENT
Start: 2019-09-17 | End: 2019-09-17

## 2019-09-17 RX ADMIN — TRIAMCINOLONE ACETONIDE 80 MG: 40 INJECTION, SUSPENSION INTRA-ARTICULAR; INTRAMUSCULAR at 13:50

## 2019-09-17 NOTE — PROGRESS NOTES
" Subjective   Jimmie Ortega is a 64 y.o. male.     History of Present Illness     Sinus symptoms and cough few days.  Going on vacation soon and wants something to get over this fast.  Dr nguyen in [place] fixed his left wrist.  Surgery 6 weeks ago.    Review of Systems   Constitutional: Negative for chills, fatigue and fever.   HENT: Positive for congestion and sore throat. Negative for ear discharge, ear pain, facial swelling, hearing loss, postnasal drip, rhinorrhea, sinus pressure, trouble swallowing and voice change.    Eyes: Negative for discharge, redness and visual disturbance.   Respiratory: Positive for cough. Negative for chest tightness, shortness of breath and wheezing.    Cardiovascular: Negative for chest pain and palpitations.   Gastrointestinal: Negative for abdominal pain, blood in stool, constipation, diarrhea, nausea and vomiting.   Endocrine: Negative for polydipsia and polyuria.   Genitourinary: Negative for dysuria, flank pain, hematuria and urgency.   Musculoskeletal: Negative for arthralgias, back pain, joint swelling and myalgias.   Skin: Negative for rash.   Neurological: Negative for dizziness, weakness, numbness and headaches.   Hematological: Negative for adenopathy.   Psychiatric/Behavioral: Negative for confusion and sleep disturbance. The patient is not nervous/anxious.            /62 (BP Location: Left arm, Patient Position: Sitting, Cuff Size: Adult)   Pulse 85   Temp 97.7 °F (36.5 °C) (Temporal)   Ht 170.2 cm (67.01\")   Wt 103 kg (226 lb 6.4 oz)   SpO2 95%   BMI 35.45 kg/m²       Objective     Physical Exam   Constitutional: He is oriented to person, place, and time. He appears well-developed and well-nourished.   HENT:   Head: Normocephalic and atraumatic.   Right Ear: External ear normal.   Left Ear: External ear normal.   Nose: Nose normal.   Eyes: Conjunctivae and EOM are normal. Pupils are equal, round, and reactive to light.   Neck: Normal range of " motion. Neck supple.   Cardiovascular: Normal rate, regular rhythm and normal heart sounds. Exam reveals no gallop and no friction rub.   No murmur heard.  Pulmonary/Chest: Effort normal and breath sounds normal.   Abdominal: Soft. Bowel sounds are normal. He exhibits no distension. There is no tenderness. There is no rebound and no guarding.   Musculoskeletal: Normal range of motion. He exhibits no edema or deformity.   Neurological: He is alert and oriented to person, place, and time. No cranial nerve deficit.   Skin: Skin is warm and dry. No rash noted. No erythema.   Psychiatric: He has a normal mood and affect. His behavior is normal. Judgment and thought content normal.   Nursing note and vitals reviewed.          PAST MEDICAL HISTORY     Past Medical History:   Diagnosis Date   • Cardiac disease    • Cerebral palsy (CMS/HCC)    • Renal cancer (CMS/HCC)       PAST SURGICAL HISTORY     Past Surgical History:   Procedure Laterality Date   • APPENDECTOMY     • CARDIAC CATHETERIZATION     • NEPHRECTOMY     • TENDON REPAIR        SOCIAL HISTORY     Social History     Socioeconomic History   • Marital status:      Spouse name: Not on file   • Number of children: Not on file   • Years of education: Not on file   • Highest education level: Not on file   Tobacco Use   • Smoking status: Never Smoker   • Smokeless tobacco: Never Used   Substance and Sexual Activity   • Alcohol use: Yes     Comment: occasional   • Drug use: No   • Sexual activity: Defer      ALLERGIES   Morphine   MEDICATIONS     Current Outpatient Medications   Medication Sig Dispense Refill   • aspirin 81 MG EC tablet Take 81 mg by mouth Daily.     • atorvastatin (LIPITOR) 20 MG tablet TAKE 1 TABLET(20 MG) BY MOUTH EVERY NIGHT 90 tablet 1   • fluticasone (FLONASE) 50 MCG/ACT nasal spray 2 sprays into the nostril(s) as directed by provider Daily. 1 bottle 11   • isosorbide mononitrate (IMDUR) 30 MG 24 hr tablet TAKE 1 TABLET(30 MG) BY MOUTH DAILY  90 tablet 2   • lisinopril-hydrochlorothiazide (PRINZIDE,ZESTORETIC) 20-12.5 MG per tablet TAKE 1 TABLET BY MOUTH DAILY 90 tablet 3   • metoprolol succinate XL (TOPROL-XL) 25 MG 24 hr tablet TAKE 1 TABLET BY MOUTH DAILY 90 tablet 2   • albuterol 108 (90 Base) MCG/ACT inhaler Inhale 2 puffs Every 4 (Four) Hours As Needed for Wheezing. 1 inhaler 11   • cyproheptadine 2 MG/5ML syrup Take 5-10 mL by mouth Every 8 (Eight) Hours. 240 mL 12     No current facility-administered medications for this visit.         The following portions of the patient's history were reviewed and updated as appropriate: allergies, current medications, past family history, past medical history, past social history, past surgical history and problem list.        Assessment/Plan   Jimmie was seen today for cough, nasal congestion, sore throat, sinus problem and earache.    Diagnoses and all orders for this visit:    Seasonal allergic rhinitis, unspecified trigger  -     triamcinolone acetonide (KENALOG-40) injection 80 mg    Cough  -     triamcinolone acetonide (KENALOG-40) injection 80 mg        He has claritin and cough medicine at home.                No Follow-up on file.                  This document has been electronically signed by Jimmie Mathew MD on September 17, 2019 12:57 PM

## 2019-09-17 NOTE — PATIENT INSTRUCTIONS

## 2019-10-01 LAB
BH CV ECHO MEAS - AO MAX PG (FULL): 2.9 MMHG
BH CV ECHO MEAS - AO MAX PG: 7.8 MMHG
BH CV ECHO MEAS - AO MEAN PG (FULL): 2 MMHG
BH CV ECHO MEAS - AO MEAN PG: 5 MMHG
BH CV ECHO MEAS - AO V2 MAX: 140 CM/SEC
BH CV ECHO MEAS - AO V2 MEAN: 102 CM/SEC
BH CV ECHO MEAS - AO V2 VTI: 23.3 CM
BH CV ECHO MEAS - ASC AORTA: 3.1 CM
BH CV ECHO MEAS - AVA(I,A): 3.6 CM^2
BH CV ECHO MEAS - AVA(I,D): 3.6 CM^2
BH CV ECHO MEAS - AVA(V,A): 3.3 CM^2
BH CV ECHO MEAS - AVA(V,D): 3.3 CM^2
BH CV ECHO MEAS - BSA(HAYCOCK): 2.2 M^2
BH CV ECHO MEAS - BSA: 2.1 M^2
BH CV ECHO MEAS - BZI_BMI: 35.4 KILOGRAMS/M^2
BH CV ECHO MEAS - BZI_METRIC_HEIGHT: 170.2 CM
BH CV ECHO MEAS - BZI_METRIC_WEIGHT: 102.5 KG
BH CV ECHO MEAS - EDV(CUBED): 148.9 ML
BH CV ECHO MEAS - EDV(MOD-SP2): 54 ML
BH CV ECHO MEAS - EDV(MOD-SP4): 50 ML
BH CV ECHO MEAS - EDV(TEICH): 135.3 ML
BH CV ECHO MEAS - EF(CUBED): 78 %
BH CV ECHO MEAS - EF(MOD-BP): 70 %
BH CV ECHO MEAS - EF(MOD-SP2): 68.5 %
BH CV ECHO MEAS - EF(MOD-SP4): 70 %
BH CV ECHO MEAS - EF(TEICH): 69.7 %
BH CV ECHO MEAS - ESV(CUBED): 32.8 ML
BH CV ECHO MEAS - ESV(MOD-SP2): 17 ML
BH CV ECHO MEAS - ESV(MOD-SP4): 15 ML
BH CV ECHO MEAS - ESV(TEICH): 41 ML
BH CV ECHO MEAS - FS: 39.6 %
BH CV ECHO MEAS - IVS/LVPW: 1
BH CV ECHO MEAS - IVSD: 1.2 CM
BH CV ECHO MEAS - LA DIMENSION: 4.3 CM
BH CV ECHO MEAS - LV DIASTOLIC VOL/BSA (35-75): 23.5 ML/M^2
BH CV ECHO MEAS - LV MASS(C)D: 256.6 GRAMS
BH CV ECHO MEAS - LV MASS(C)DI: 120.5 GRAMS/M^2
BH CV ECHO MEAS - LV MAX PG: 4.9 MMHG
BH CV ECHO MEAS - LV MEAN PG: 3 MMHG
BH CV ECHO MEAS - LV SYSTOLIC VOL/BSA (12-30): 7 ML/M^2
BH CV ECHO MEAS - LV V1 MAX: 111 CM/SEC
BH CV ECHO MEAS - LV V1 MEAN: 71.6 CM/SEC
BH CV ECHO MEAS - LV V1 VTI: 20.1 CM
BH CV ECHO MEAS - LVIDD: 5.3 CM
BH CV ECHO MEAS - LVIDS: 3.2 CM
BH CV ECHO MEAS - LVLD AP2: 7.1 CM
BH CV ECHO MEAS - LVLD AP4: 6.7 CM
BH CV ECHO MEAS - LVLS AP2: 6 CM
BH CV ECHO MEAS - LVLS AP4: 5.8 CM
BH CV ECHO MEAS - LVOT AREA (M): 4.2 CM^2
BH CV ECHO MEAS - LVOT AREA: 4.2 CM^2
BH CV ECHO MEAS - LVOT DIAM: 2.3 CM
BH CV ECHO MEAS - LVPWD: 1.2 CM
BH CV ECHO MEAS - MV A MAX VEL: 78 CM/SEC
BH CV ECHO MEAS - MV DEC SLOPE: 441 CM/SEC^2
BH CV ECHO MEAS - MV E MAX VEL: 68.6 CM/SEC
BH CV ECHO MEAS - MV E/A: 0.88
BH CV ECHO MEAS - MV P1/2T MAX VEL: 95.1 CM/SEC
BH CV ECHO MEAS - MV P1/2T: 63.2 MSEC
BH CV ECHO MEAS - MVA P1/2T LCG: 2.3 CM^2
BH CV ECHO MEAS - MVA(P1/2T): 3.5 CM^2
BH CV ECHO MEAS - PA MAX PG (FULL): 2.9 MMHG
BH CV ECHO MEAS - PA MAX PG: 5.4 MMHG
BH CV ECHO MEAS - PA V2 MAX: 116 CM/SEC
BH CV ECHO MEAS - RAP SYSTOLE: 5 MMHG
BH CV ECHO MEAS - RV MAX PG: 2.5 MMHG
BH CV ECHO MEAS - RV MEAN PG: 1 MMHG
BH CV ECHO MEAS - RV V1 MAX: 79 CM/SEC
BH CV ECHO MEAS - RV V1 MEAN: 57.5 CM/SEC
BH CV ECHO MEAS - RV V1 VTI: 17.1 CM
BH CV ECHO MEAS - RVDD: 3.2 CM
BH CV ECHO MEAS - RVSP: 26.5 MMHG
BH CV ECHO MEAS - SI(CUBED): 54.5 ML/M^2
BH CV ECHO MEAS - SI(LVOT): 39.2 ML/M^2
BH CV ECHO MEAS - SI(MOD-SP2): 17.4 ML/M^2
BH CV ECHO MEAS - SI(MOD-SP4): 16.4 ML/M^2
BH CV ECHO MEAS - SI(TEICH): 44.3 ML/M^2
BH CV ECHO MEAS - SV(CUBED): 116.1 ML
BH CV ECHO MEAS - SV(LVOT): 83.5 ML
BH CV ECHO MEAS - SV(MOD-SP2): 37 ML
BH CV ECHO MEAS - SV(MOD-SP4): 35 ML
BH CV ECHO MEAS - SV(TEICH): 94.4 ML
BH CV ECHO MEAS - TR MAX VEL: 232 CM/SEC
MAXIMAL PREDICTED HEART RATE: 154 BPM
STRESS TARGET HR: 131 BPM

## 2019-10-02 ENCOUNTER — LAB (OUTPATIENT)
Dept: LAB | Facility: HOSPITAL | Age: 64
End: 2019-10-02

## 2019-10-02 DIAGNOSIS — N18.30 CHRONIC KIDNEY DISEASE, STAGE III (MODERATE) (HCC): ICD-10-CM

## 2019-10-02 LAB
BACTERIA UR QL AUTO: NORMAL /HPF
BILIRUB UR QL STRIP: NEGATIVE
CLARITY UR: CLEAR
COLOR UR: YELLOW
DEPRECATED RDW RBC AUTO: 42.5 FL (ref 37–54)
ERYTHROCYTE [DISTWIDTH] IN BLOOD BY AUTOMATED COUNT: 13.2 % (ref 12.3–15.4)
GLUCOSE UR STRIP-MCNC: NEGATIVE MG/DL
HCT VFR BLD AUTO: 42.4 % (ref 37.5–51)
HGB BLD-MCNC: 14.5 G/DL (ref 13–17.7)
HGB UR QL STRIP.AUTO: NEGATIVE
HYALINE CASTS UR QL AUTO: NORMAL /LPF
KETONES UR QL STRIP: NEGATIVE
LEUKOCYTE ESTERASE UR QL STRIP.AUTO: NEGATIVE
MCH RBC QN AUTO: 30 PG (ref 26.6–33)
MCHC RBC AUTO-ENTMCNC: 34.2 G/DL (ref 31.5–35.7)
MCV RBC AUTO: 87.8 FL (ref 79–97)
NITRITE UR QL STRIP: NEGATIVE
PH UR STRIP.AUTO: 5.5 [PH] (ref 5–8)
PLATELET # BLD AUTO: 173 10*3/MM3 (ref 140–450)
PMV BLD AUTO: 9.6 FL (ref 6–12)
PROT UR QL STRIP: NEGATIVE
RBC # BLD AUTO: 4.83 10*6/MM3 (ref 4.14–5.8)
RBC # UR: NORMAL /HPF
REF LAB TEST METHOD: NORMAL
SP GR UR STRIP: 1.01 (ref 1–1.03)
SQUAMOUS #/AREA URNS HPF: NORMAL /HPF
UROBILINOGEN UR QL STRIP: NORMAL
WBC NRBC COR # BLD: 7.81 10*3/MM3 (ref 3.4–10.8)
WBC UR QL AUTO: NORMAL /HPF

## 2019-10-02 PROCEDURE — 80069 RENAL FUNCTION PANEL: CPT | Performed by: INTERNAL MEDICINE

## 2019-10-02 PROCEDURE — 82306 VITAMIN D 25 HYDROXY: CPT | Performed by: INTERNAL MEDICINE

## 2019-10-02 PROCEDURE — 81001 URINALYSIS AUTO W/SCOPE: CPT | Performed by: INTERNAL MEDICINE

## 2019-10-02 PROCEDURE — 85027 COMPLETE CBC AUTOMATED: CPT | Performed by: INTERNAL MEDICINE

## 2019-10-03 LAB
25(OH)D3 SERPL-MCNC: 39.2 NG/ML (ref 30–100)
ALBUMIN SERPL-MCNC: 4.8 G/DL (ref 3.5–5.2)
ANION GAP SERPL CALCULATED.3IONS-SCNC: 12.9 MMOL/L (ref 5–15)
BUN BLD-MCNC: 27 MG/DL (ref 8–23)
BUN/CREAT SERPL: 18.8 (ref 7–25)
CALCIUM SPEC-SCNC: 9.7 MG/DL (ref 8.6–10.5)
CHLORIDE SERPL-SCNC: 98 MMOL/L (ref 98–107)
CO2 SERPL-SCNC: 22.1 MMOL/L (ref 22–29)
CREAT BLD-MCNC: 1.44 MG/DL (ref 0.76–1.27)
GFR SERPL CREATININE-BSD FRML MDRD: 49 ML/MIN/1.73
GLUCOSE BLD-MCNC: 99 MG/DL (ref 65–99)
PHOSPHATE SERPL-MCNC: 3.2 MG/DL (ref 2.5–4.5)
POTASSIUM BLD-SCNC: 4.6 MMOL/L (ref 3.5–5.2)
SODIUM BLD-SCNC: 133 MMOL/L (ref 136–145)

## 2019-10-07 ENCOUNTER — TELEPHONE (OUTPATIENT)
Dept: FAMILY MEDICINE CLINIC | Facility: CLINIC | Age: 64
End: 2019-10-07

## 2019-10-07 RX ORDER — METHYLPREDNISOLONE 4 MG/1
TABLET ORAL
Qty: 21 TABLET | Refills: 0 | Status: SHIPPED | OUTPATIENT
Start: 2019-10-07 | End: 2019-10-14

## 2019-10-07 NOTE — TELEPHONE ENCOUNTER
PATIENT IS ON VACATION IN [place].  HE ASKED IF YOU WOULD SEND RX FOR STEROID PACK FOR SCRATCHY THROAT, COUGH, AND SINUS ISSUES.  MARIEL HANSEN, [place]

## 2019-10-14 ENCOUNTER — OFFICE VISIT (OUTPATIENT)
Dept: FAMILY MEDICINE CLINIC | Facility: CLINIC | Age: 64
End: 2019-10-14

## 2019-10-14 VITALS
BODY MASS INDEX: 34.37 KG/M2 | OXYGEN SATURATION: 97 % | HEIGHT: 67 IN | SYSTOLIC BLOOD PRESSURE: 120 MMHG | WEIGHT: 219 LBS | HEART RATE: 99 BPM | TEMPERATURE: 97.8 F | DIASTOLIC BLOOD PRESSURE: 74 MMHG

## 2019-10-14 DIAGNOSIS — J20.9 ACUTE BRONCHITIS, UNSPECIFIED ORGANISM: Primary | ICD-10-CM

## 2019-10-14 PROCEDURE — 99213 OFFICE O/P EST LOW 20 MIN: CPT | Performed by: FAMILY MEDICINE

## 2019-10-14 PROCEDURE — 96372 THER/PROPH/DIAG INJ SC/IM: CPT | Performed by: FAMILY MEDICINE

## 2019-10-14 RX ORDER — TRIAMCINOLONE ACETONIDE 40 MG/ML
80 INJECTION, SUSPENSION INTRA-ARTICULAR; INTRAMUSCULAR ONCE
Status: COMPLETED | OUTPATIENT
Start: 2019-10-14 | End: 2019-10-14

## 2019-10-14 RX ORDER — AZITHROMYCIN 250 MG/1
TABLET, FILM COATED ORAL
Qty: 6 TABLET | Refills: 0 | Status: SHIPPED | OUTPATIENT
Start: 2019-10-14 | End: 2020-08-05

## 2019-10-14 RX ADMIN — TRIAMCINOLONE ACETONIDE 80 MG: 40 INJECTION, SUSPENSION INTRA-ARTICULAR; INTRAMUSCULAR at 11:49

## 2019-10-14 NOTE — PATIENT INSTRUCTIONS

## 2019-10-14 NOTE — PROGRESS NOTES
" Subjective   Jimmie Ortega is a 64 y.o. male.     History of Present Illness     Almost 2 weeks, still sore throat, sinus and cough. Medrol dose pack didn't help.  He thinks a steroid shot would do better.     Review of Systems   Constitutional: Negative for chills, fatigue and fever.   HENT: Positive for congestion and sore throat. Negative for ear discharge, ear pain, facial swelling, hearing loss, postnasal drip, rhinorrhea, sinus pressure, trouble swallowing and voice change.    Eyes: Negative for discharge, redness and visual disturbance.   Respiratory: Positive for cough. Negative for chest tightness, shortness of breath and wheezing.    Cardiovascular: Negative for chest pain and palpitations.   Gastrointestinal: Negative for abdominal pain, blood in stool, constipation, diarrhea, nausea and vomiting.   Endocrine: Negative for polydipsia and polyuria.   Genitourinary: Negative for dysuria, flank pain, hematuria and urgency.   Musculoskeletal: Negative for arthralgias, back pain, joint swelling and myalgias.   Skin: Negative for rash.   Neurological: Negative for dizziness, weakness, numbness and headaches.   Hematological: Negative for adenopathy.   Psychiatric/Behavioral: Negative for confusion and sleep disturbance. The patient is not nervous/anxious.            /74 (BP Location: Left arm, Patient Position: Sitting, Cuff Size: Adult)   Pulse 99   Temp 97.8 °F (36.6 °C) (Temporal)   Ht 170.2 cm (67.01\")   Wt 99.3 kg (219 lb)   SpO2 97%   BMI 34.29 kg/m²       Objective     Physical Exam   Constitutional: He is oriented to person, place, and time. He appears well-developed and well-nourished.   HENT:   Head: Normocephalic and atraumatic.   Right Ear: External ear normal.   Left Ear: External ear normal.   Nose: Nose normal.   Eyes: Conjunctivae and EOM are normal. Pupils are equal, round, and reactive to light.   Neck: Normal range of motion. Neck supple.   Cardiovascular: Normal rate, " regular rhythm and normal heart sounds. Exam reveals no gallop and no friction rub.   No murmur heard.  Pulmonary/Chest: Effort normal and breath sounds normal.   Abdominal: Soft. Bowel sounds are normal. He exhibits no distension. There is no tenderness. There is no rebound and no guarding.   Musculoskeletal: Normal range of motion. He exhibits no edema or deformity.   Neurological: He is alert and oriented to person, place, and time. No cranial nerve deficit.   Skin: Skin is warm and dry. No rash noted. No erythema.   Psychiatric: He has a normal mood and affect. His behavior is normal. Judgment and thought content normal.   Nursing note and vitals reviewed.          PAST MEDICAL HISTORY     Past Medical History:   Diagnosis Date   • Cardiac disease    • Cerebral palsy (CMS/HCC)    • Renal cancer (CMS/HCC)       PAST SURGICAL HISTORY     Past Surgical History:   Procedure Laterality Date   • APPENDECTOMY     • CARDIAC CATHETERIZATION     • NEPHRECTOMY     • TENDON REPAIR        SOCIAL HISTORY     Social History     Socioeconomic History   • Marital status:      Spouse name: Not on file   • Number of children: Not on file   • Years of education: Not on file   • Highest education level: Not on file   Tobacco Use   • Smoking status: Never Smoker   • Smokeless tobacco: Never Used   Substance and Sexual Activity   • Alcohol use: Yes     Comment: occasional   • Drug use: No   • Sexual activity: Defer      ALLERGIES   Morphine   MEDICATIONS     Current Outpatient Medications   Medication Sig Dispense Refill   • aspirin 81 MG EC tablet Take 81 mg by mouth Daily.     • atorvastatin (LIPITOR) 20 MG tablet TAKE 1 TABLET(20 MG) BY MOUTH EVERY NIGHT 90 tablet 1   • fluticasone (FLONASE) 50 MCG/ACT nasal spray 2 sprays into the nostril(s) as directed by provider Daily. 1 bottle 11   • isosorbide mononitrate (IMDUR) 30 MG 24 hr tablet TAKE 1 TABLET(30 MG) BY MOUTH DAILY 90 tablet 2   • lisinopril-hydrochlorothiazide  (PRINZIDE,ZESTORETIC) 20-12.5 MG per tablet TAKE 1 TABLET BY MOUTH DAILY 90 tablet 3   • metoprolol succinate XL (TOPROL-XL) 25 MG 24 hr tablet TAKE 1 TABLET BY MOUTH DAILY 90 tablet 2   • albuterol 108 (90 Base) MCG/ACT inhaler Inhale 2 puffs Every 4 (Four) Hours As Needed for Wheezing. 1 inhaler 11   • azithromycin (ZITHROMAX) 250 MG tablet Take 2 tablets the first day, then 1 tablet daily for 4 days. 6 tablet 0   • cyproheptadine 2 MG/5ML syrup Take 5-10 mL by mouth Every 8 (Eight) Hours. 240 mL 12     No current facility-administered medications for this visit.         The following portions of the patient's history were reviewed and updated as appropriate: allergies, current medications, past family history, past medical history, past social history, past surgical history and problem list.        Assessment/Plan   Jimmie was seen today for cough, uri, nasal congestion and sore throat.    Diagnoses and all orders for this visit:    Acute bronchitis, unspecified organism  -     triamcinolone acetonide (KENALOG-40) injection 80 mg    Other orders  -     azithromycin (ZITHROMAX) 250 MG tablet; Take 2 tablets the first day, then 1 tablet daily for 4 days.                       No Follow-up on file.                  This document has been electronically signed by Jimmie Mathew MD on October 14, 2019 1:09 PM

## 2019-10-15 DIAGNOSIS — I25.10 CORONARY ARTERY DISEASE INVOLVING NATIVE CORONARY ARTERY OF NATIVE HEART WITHOUT ANGINA PECTORIS: Primary | ICD-10-CM

## 2019-10-16 ENCOUNTER — OFFICE VISIT (OUTPATIENT)
Dept: CARDIOLOGY | Facility: CLINIC | Age: 64
End: 2019-10-16

## 2019-10-16 VITALS
SYSTOLIC BLOOD PRESSURE: 108 MMHG | HEIGHT: 67 IN | DIASTOLIC BLOOD PRESSURE: 74 MMHG | WEIGHT: 220.9 LBS | OXYGEN SATURATION: 98 % | BODY MASS INDEX: 34.67 KG/M2 | HEART RATE: 91 BPM

## 2019-10-16 DIAGNOSIS — I25.10 CORONARY ARTERY DISEASE INVOLVING NATIVE CORONARY ARTERY OF NATIVE HEART WITHOUT ANGINA PECTORIS: Primary | ICD-10-CM

## 2019-10-16 DIAGNOSIS — E78.2 MIXED HYPERLIPIDEMIA: ICD-10-CM

## 2019-10-16 PROBLEM — E66.09 CLASS 1 OBESITY DUE TO EXCESS CALORIES IN ADULT: Status: ACTIVE | Noted: 2019-10-16

## 2019-10-16 PROCEDURE — 99214 OFFICE O/P EST MOD 30 MIN: CPT | Performed by: INTERNAL MEDICINE

## 2019-10-16 PROCEDURE — 93000 ELECTROCARDIOGRAM COMPLETE: CPT | Performed by: INTERNAL MEDICINE

## 2019-10-16 NOTE — PATIENT INSTRUCTIONS
Preventing Unhealthy Weight Gain, Adult  Staying at a healthy weight is important to your overall health. When fat builds up in your body, you may become overweight or obese. Being overweight or obese increases your risk of developing certain health problems, such as heart disease, diabetes, sleeping problems, joint problems, and some types of cancer.  Unhealthy weight gain is often the result of making unhealthy food choices or not getting enough exercise. You can make changes to your lifestyle to prevent obesity and stay as healthy as possible.  What nutrition changes can be made?    · Eat only as much as your body needs. To do this:  ? Pay attention to signs that you are hungry or full. Stop eating as soon as you feel full.  ? If you feel hungry, try drinking water first before eating. Drink enough water so your urine is clear or pale yellow.  ? Eat smaller portions. Pay attention to portion sizes when eating out.  ? Look at serving sizes on food labels. Most foods contain more than one serving per container.  ? Eat the recommended number of calories for your gender and activity level. For most active people, a daily total of 2,000 calories is appropriate. If you are trying to lose weight or are not very active, you may need to eat fewer calories. Talk with your health care provider or a diet and nutrition specialist (dietitian) about how many calories you need each day.  · Choose healthy foods, such as:  ? Fruits and vegetables. At each meal, try to fill at least half of your plate with fruits and vegetables.  ? Whole grains, such as whole-wheat bread, brown rice, and quinoa.  ? Lean meats, such as chicken or fish.  ? Other healthy proteins, such as beans, eggs, or tofu.  ? Healthy fats, such as nuts, seeds, fatty fish, and olive oil.  ? Low-fat or fat-free dairy products.  · Check food labels, and avoid food and drinks that:  ? Are high in calories.  ? Have added sugar.  ? Are high in sodium.  ? Have saturated  fats or trans fats.  · Cook foods in healthier ways, such as by baking, broiling, or grilling.  · Make a meal plan for the week, and shop with a grocery list to help you stay on track with your purchases. Try to avoid going to the grocery store when you are hungry.  · When grocery shopping, try to shop around the outside of the store first, where the fresh foods are. Doing this helps you to avoid prepackaged foods, which can be high in sugar, salt (sodium), and fat.  What lifestyle changes can be made?    · Exercise for 30 or more minutes on 5 or more days each week. Exercising may include brisk walking, yard work, biking, running, swimming, and team sports like basketball and soccer. Ask your health care provider which exercises are safe for you.  · Do muscle-strengthening activities, such as lifting weights or using resistance bands, on 2 or more days a week.  · Do not use any products that contain nicotine or tobacco, such as cigarettes and e-cigarettes. If you need help quitting, ask your health care provider.  · Limit alcohol intake to no more than 1 drink a day for nonpregnant women and 2 drinks a day for men. One drink equals 12 oz of beer, 5 oz of wine, or 1½ oz of hard liquor.  · Try to get 7-9 hours of sleep each night.  What other changes can be made?  · Keep a food and activity journal to keep track of:  ? What you ate and how many calories you had. Remember to count the calories in sauces, dressings, and side dishes.  ? Whether you were active, and what exercises you did.  ? Your calorie, weight, and activity goals.  · Check your weight regularly. Track any changes. If you notice you have gained weight, make changes to your diet or activity routine.  · Avoid taking weight-loss medicines or supplements. Talk to your health care provider before starting any new medicine or supplement.  · Talk to your health care provider before trying any new diet or exercise plan.  Why are these changes  important?  Eating healthy, staying active, and having healthy habits can help you to prevent obesity. Those changes also:  · Help you manage stress and emotions.  · Help you connect with friends and family.  · Improve your self-esteem.  · Improve your sleep.  · Prevent long-term health problems.  What can happen if changes are not made?  Being obese or overweight can cause you to develop joint or bone problems, which can make it hard for you to stay active or do activities you enjoy. Being obese or overweight also puts stress on your heart and lungs and can lead to health problems like diabetes, heart disease, and some cancers.  Where to find more information  Talk with your health care provider or a dietitian about healthy eating and healthy lifestyle choices. You may also find information from:  · [place] Department of Agriculture, MyPlate: www.[employer]myplate.gov  · American Heart Association: www.heart.org  · Centers for Disease Control and Prevention: www.cdc.gov  Summary  · Staying at a healthy weight is important to your overall health. It helps you to prevent certain diseases and health problems, such as heart disease, diabetes, joint problems, sleep disorders, and some types of cancer.  · Being obese or overweight can cause you to develop joint or bone problems, which can make it hard for you to stay active or do activities you enjoy.  · You can prevent unhealthy weight gain by eating a healthy diet, exercising regularly, not smoking, limiting alcohol, and getting enough sleep.  · Talk with your health care provider or a dietitian for guidance about healthy eating and healthy lifestyle choices.  This information is not intended to replace advice given to you by your health care provider. Make sure you discuss any questions you have with your health care provider.  Document Released: 12/19/2017 Document Revised: 09/28/2018 Document Reviewed: 01/24/2018  [employer] Patient Education © 2019 [employer]

## 2019-10-25 ENCOUNTER — TRANSCRIBE ORDERS (OUTPATIENT)
Dept: GENERAL RADIOLOGY | Facility: CLINIC | Age: 64
End: 2019-10-25

## 2019-10-29 ENCOUNTER — TELEPHONE (OUTPATIENT)
Dept: FAMILY MEDICINE CLINIC | Facility: CLINIC | Age: 64
End: 2019-10-29

## 2019-10-29 RX ORDER — DOXYCYCLINE 100 MG/1
100 CAPSULE ORAL 2 TIMES DAILY
Qty: 20 CAPSULE | Refills: 0 | Status: SHIPPED | OUTPATIENT
Start: 2019-10-29 | End: 2020-08-05

## 2019-10-29 RX ORDER — PREDNISONE 20 MG/1
40 TABLET ORAL DAILY
Qty: 10 TABLET | Refills: 0 | Status: SHIPPED | OUTPATIENT
Start: 2019-10-29 | End: 2020-08-05

## 2019-10-29 NOTE — TELEPHONE ENCOUNTER
Still has bronchitis and wants to know if he can get a steroid shot and maybe another antibotic. They would not let him get his annual ct for renal cancer yesterday.

## 2019-10-30 ENCOUNTER — APPOINTMENT (OUTPATIENT)
Dept: MRI IMAGING | Facility: HOSPITAL | Age: 64
End: 2019-10-30

## 2019-10-30 ENCOUNTER — APPOINTMENT (OUTPATIENT)
Dept: CT IMAGING | Facility: HOSPITAL | Age: 64
End: 2019-10-30

## 2019-11-06 ENCOUNTER — HOSPITAL ENCOUNTER (OUTPATIENT)
Dept: CT IMAGING | Facility: HOSPITAL | Age: 64
Discharge: HOME | End: 2019-11-06

## 2019-11-06 ENCOUNTER — HOSPITAL ENCOUNTER (OUTPATIENT)
Dept: MRI IMAGING | Facility: HOSPITAL | Age: 64
Discharge: HOME | End: 2019-11-06
Admitting: INTERNAL MEDICINE

## 2019-11-06 DIAGNOSIS — C64.9 MALIGNANT NEOPLASM OF KIDNEY, UNSPECIFIED LATERALITY (HCC): ICD-10-CM

## 2019-11-06 PROCEDURE — 74183 MRI ABD W/O CNTR FLWD CNTR: CPT

## 2019-11-06 PROCEDURE — 71250 CT THORAX DX C-: CPT

## 2019-11-06 PROCEDURE — A9576 INJ PROHANCE MULTIPACK: HCPCS | Performed by: INTERNAL MEDICINE

## 2019-11-06 PROCEDURE — 25010000002 GADOTERIDOL PER 1 ML: Performed by: INTERNAL MEDICINE

## 2019-11-06 RX ADMIN — GADOTERIDOL 20 ML: 279.3 INJECTION, SOLUTION INTRAVENOUS at 14:48

## 2019-11-12 ENCOUNTER — FLU SHOT (OUTPATIENT)
Dept: FAMILY MEDICINE CLINIC | Facility: CLINIC | Age: 64
End: 2019-11-12

## 2019-11-12 DIAGNOSIS — Z23 FLU VACCINE NEED: ICD-10-CM

## 2019-11-12 PROCEDURE — 90674 CCIIV4 VAC NO PRSV 0.5 ML IM: CPT | Performed by: FAMILY MEDICINE

## 2019-11-12 PROCEDURE — G0008 ADMIN INFLUENZA VIRUS VAC: HCPCS | Performed by: FAMILY MEDICINE

## 2019-12-19 RX ORDER — LISINOPRIL AND HYDROCHLOROTHIAZIDE 20; 12.5 MG/1; MG/1
1 TABLET ORAL DAILY
Qty: 90 TABLET | Refills: 3 | Status: SHIPPED | OUTPATIENT
Start: 2019-12-19 | End: 2020-08-05 | Stop reason: ALTCHOICE

## 2020-03-16 RX ORDER — ISOSORBIDE MONONITRATE 30 MG/1
TABLET, EXTENDED RELEASE ORAL
Qty: 90 TABLET | Refills: 2 | Status: SHIPPED | OUTPATIENT
Start: 2020-03-16 | End: 2020-12-17

## 2020-03-17 DIAGNOSIS — E78.5 HYPERLIPIDEMIA LDL GOAL <70: Primary | ICD-10-CM

## 2020-03-17 RX ORDER — ATORVASTATIN CALCIUM 20 MG/1
TABLET, FILM COATED ORAL
Qty: 90 TABLET | Refills: 1 | Status: SHIPPED | OUTPATIENT
Start: 2020-03-17 | End: 2020-04-02 | Stop reason: DRUGHIGH

## 2020-03-31 ENCOUNTER — APPOINTMENT (OUTPATIENT)
Dept: LAB | Facility: HOSPITAL | Age: 65
End: 2020-03-31

## 2020-03-31 PROCEDURE — 80053 COMPREHEN METABOLIC PANEL: CPT | Performed by: INTERNAL MEDICINE

## 2020-03-31 PROCEDURE — 80061 LIPID PANEL: CPT | Performed by: INTERNAL MEDICINE

## 2020-04-01 LAB
ALBUMIN SERPL-MCNC: 4.6 G/DL (ref 3.5–5.2)
ALBUMIN/GLOB SERPL: 1.6 G/DL
ALP SERPL-CCNC: 78 U/L (ref 39–117)
ALT SERPL W P-5'-P-CCNC: 22 U/L (ref 1–41)
ANION GAP SERPL CALCULATED.3IONS-SCNC: 14 MMOL/L (ref 5–15)
AST SERPL-CCNC: 25 U/L (ref 1–40)
BILIRUB SERPL-MCNC: 0.5 MG/DL (ref 0.2–1.2)
BUN BLD-MCNC: 26 MG/DL (ref 8–23)
BUN/CREAT SERPL: 18.1 (ref 7–25)
CALCIUM SPEC-SCNC: 9.4 MG/DL (ref 8.6–10.5)
CHLORIDE SERPL-SCNC: 95 MMOL/L (ref 98–107)
CHOLEST SERPL-MCNC: 169 MG/DL (ref 0–200)
CO2 SERPL-SCNC: 20 MMOL/L (ref 22–29)
CREAT BLD-MCNC: 1.44 MG/DL (ref 0.76–1.27)
GFR SERPL CREATININE-BSD FRML MDRD: 49 ML/MIN/1.73
GLOBULIN UR ELPH-MCNC: 2.8 GM/DL
GLUCOSE BLD-MCNC: 129 MG/DL (ref 65–99)
HDLC SERPL-MCNC: 27 MG/DL (ref 40–60)
LDLC SERPL CALC-MCNC: 105 MG/DL (ref 0–100)
LDLC/HDLC SERPL: 3.9 {RATIO}
POTASSIUM BLD-SCNC: 4.4 MMOL/L (ref 3.5–5.2)
PROT SERPL-MCNC: 7.4 G/DL (ref 6–8.5)
SODIUM BLD-SCNC: 129 MMOL/L (ref 136–145)
TRIGL SERPL-MCNC: 184 MG/DL (ref 0–150)
VLDLC SERPL-MCNC: 36.8 MG/DL (ref 5–40)

## 2020-04-02 ENCOUNTER — TELEPHONE (OUTPATIENT)
Dept: CARDIOLOGY | Facility: CLINIC | Age: 65
End: 2020-04-02

## 2020-04-02 RX ORDER — ATORVASTATIN CALCIUM 40 MG/1
40 TABLET, FILM COATED ORAL DAILY
Qty: 30 TABLET | Refills: 6 | Status: SHIPPED | OUTPATIENT
Start: 2020-04-02

## 2020-04-02 NOTE — TELEPHONE ENCOUNTER
----- Message from Kvng Frank MD PhD sent at 4/2/2020  8:18 AM CDT -----  Can you let him know that I reviewed his labs?  His LDL is still not at goal.  He is on a very low-dose of Lipitor.  Just make sure he is not having problems with them in the past.  If he has not, I would like for him to go up to 40 mg once a day.  Lastly, his renal function is stable.      Patient was contacted regarding lab results per dr. Frank. Mr. perez states he has not had any issues with lipid lowering medications and is agreeable to increasing his dose to 40 mg.

## 2020-04-15 ENCOUNTER — TRANSCRIBE ORDERS (OUTPATIENT)
Dept: LAB | Facility: HOSPITAL | Age: 65
End: 2020-04-15

## 2020-04-15 DIAGNOSIS — N18.30 CHRONIC KIDNEY DISEASE, STAGE III (MODERATE) (HCC): Primary | ICD-10-CM

## 2020-04-17 ENCOUNTER — LAB (OUTPATIENT)
Dept: LAB | Facility: HOSPITAL | Age: 65
End: 2020-04-17

## 2020-04-17 DIAGNOSIS — N18.30 CHRONIC KIDNEY DISEASE, STAGE III (MODERATE) (HCC): ICD-10-CM

## 2020-04-17 PROCEDURE — 82570 ASSAY OF URINE CREATININE: CPT | Performed by: INTERNAL MEDICINE

## 2020-04-17 PROCEDURE — 83970 ASSAY OF PARATHORMONE: CPT | Performed by: INTERNAL MEDICINE

## 2020-04-17 PROCEDURE — 83735 ASSAY OF MAGNESIUM: CPT | Performed by: INTERNAL MEDICINE

## 2020-04-17 PROCEDURE — 82306 VITAMIN D 25 HYDROXY: CPT | Performed by: INTERNAL MEDICINE

## 2020-04-17 PROCEDURE — 80069 RENAL FUNCTION PANEL: CPT | Performed by: INTERNAL MEDICINE

## 2020-04-17 PROCEDURE — 84156 ASSAY OF PROTEIN URINE: CPT | Performed by: INTERNAL MEDICINE

## 2020-04-20 LAB
25(OH)D3 SERPL-MCNC: 33.2 NG/ML (ref 30–100)
ALBUMIN SERPL-MCNC: 4.2 G/DL (ref 3.5–5.2)
ANION GAP SERPL CALCULATED.3IONS-SCNC: 14.3 MMOL/L (ref 5–15)
BUN BLD-MCNC: 22 MG/DL (ref 8–23)
BUN/CREAT SERPL: 16.9 (ref 7–25)
CALCIUM SPEC-SCNC: 9.6 MG/DL (ref 8.6–10.5)
CHLORIDE SERPL-SCNC: 99 MMOL/L (ref 98–107)
CO2 SERPL-SCNC: 22.7 MMOL/L (ref 22–29)
CREAT BLD-MCNC: 1.3 MG/DL (ref 0.76–1.27)
CREAT UR-MCNC: 108.1 MG/DL
GFR SERPL CREATININE-BSD FRML MDRD: 55 ML/MIN/1.73
GLUCOSE BLD-MCNC: 114 MG/DL (ref 65–99)
MAGNESIUM SERPL-MCNC: 2.1 MG/DL (ref 1.6–2.4)
PHOSPHATE SERPL-MCNC: 3.3 MG/DL (ref 2.5–4.5)
POTASSIUM BLD-SCNC: 4.5 MMOL/L (ref 3.5–5.2)
PROT UR-MCNC: 6 MG/DL
PROT/CREAT UR: 55.5 MG/G CREA (ref 0–200)
PTH-INTACT SERPL-MCNC: 29.4 PG/ML (ref 15–65)
SODIUM BLD-SCNC: 136 MMOL/L (ref 136–145)

## 2020-04-20 RX ORDER — FLUTICASONE PROPIONATE 50 MCG
SPRAY, SUSPENSION (ML) NASAL
Qty: 16 G | Refills: 10 | Status: SHIPPED | OUTPATIENT
Start: 2020-04-20

## 2020-06-16 RX ORDER — METOPROLOL SUCCINATE 25 MG/1
TABLET, EXTENDED RELEASE ORAL
Qty: 90 TABLET | Refills: 2 | Status: SHIPPED | OUTPATIENT
Start: 2020-06-16 | End: 2021-03-15

## 2020-08-05 ENCOUNTER — OFFICE VISIT (OUTPATIENT)
Dept: FAMILY MEDICINE CLINIC | Facility: CLINIC | Age: 65
End: 2020-08-05

## 2020-08-05 VITALS
BODY MASS INDEX: 36.7 KG/M2 | OXYGEN SATURATION: 95 % | SYSTOLIC BLOOD PRESSURE: 92 MMHG | DIASTOLIC BLOOD PRESSURE: 70 MMHG | TEMPERATURE: 99 F | HEIGHT: 67 IN | HEART RATE: 76 BPM | WEIGHT: 233.8 LBS

## 2020-08-05 DIAGNOSIS — I10 ESSENTIAL HYPERTENSION: Primary | ICD-10-CM

## 2020-08-05 PROCEDURE — 99214 OFFICE O/P EST MOD 30 MIN: CPT | Performed by: FAMILY MEDICINE

## 2020-08-05 RX ORDER — LISINOPRIL 20 MG/1
10-20 TABLET ORAL DAILY
Qty: 30 TABLET | Refills: 0 | Status: SHIPPED | OUTPATIENT
Start: 2020-08-05 | End: 2020-08-05

## 2020-08-05 RX ORDER — LISINOPRIL 20 MG/1
TABLET ORAL
Qty: 90 TABLET | Refills: 3 | Status: SHIPPED | OUTPATIENT
Start: 2020-08-05 | End: 2021-07-09

## 2020-08-05 NOTE — PATIENT INSTRUCTIONS

## 2020-08-05 NOTE — PROGRESS NOTES
" Subjective   Jimmie Ortega is a 65 y.o. male.     History of Present Illness     Cc blood pressure  Has had neprhectomy due to renal cancer.  Renal cancer found in right thyroid, had right thyroid removed.  He get monthly chemotherapy.  His bp has been low. Renal function  Has been good.  gfr 55 4/17/20    Review of Systems   Constitutional: Negative for chills, fatigue and fever.   HENT: Negative for congestion, ear discharge, ear pain, facial swelling, hearing loss, postnasal drip, rhinorrhea, sinus pressure, sore throat, trouble swallowing and voice change.    Eyes: Negative for discharge, redness and visual disturbance.   Respiratory: Negative for cough, chest tightness, shortness of breath and wheezing.    Cardiovascular: Negative for chest pain and palpitations.   Gastrointestinal: Negative for abdominal pain, blood in stool, constipation, diarrhea, nausea and vomiting.   Endocrine: Negative for polydipsia and polyuria.   Genitourinary: Negative for dysuria, flank pain, hematuria and urgency.   Musculoskeletal: Negative for arthralgias, back pain, joint swelling and myalgias.   Skin: Negative for rash.   Neurological: Negative for dizziness, weakness, numbness and headaches.   Hematological: Negative for adenopathy.   Psychiatric/Behavioral: Negative for confusion and sleep disturbance. The patient is not nervous/anxious.            BP 92/70 (BP Location: Left arm, Patient Position: Sitting, Cuff Size: Adult)   Pulse 76   Temp 99 °F (37.2 °C) (Temporal)   Ht 170.2 cm (67.01\")   Wt 106 kg (233 lb 12.8 oz)   SpO2 95%   BMI 36.61 kg/m²       Objective     Physical Exam   Constitutional: He is oriented to person, place, and time. He appears well-developed and well-nourished.   HENT:   Head: Normocephalic and atraumatic.   Right Ear: External ear normal.   Left Ear: External ear normal.   Nose: Nose normal.   Eyes: Pupils are equal, round, and reactive to light. Conjunctivae and EOM are normal. "   Neck: Normal range of motion.   Pulmonary/Chest: Effort normal.   Musculoskeletal: Normal range of motion.   Neurological: He is alert and oriented to person, place, and time.   Psychiatric: He has a normal mood and affect. His behavior is normal. Judgment and thought content normal.   Nursing note and vitals reviewed.          PAST MEDICAL HISTORY     Past Medical History:   Diagnosis Date   • Cardiac disease    • Cerebral palsy (CMS/HCC)    • Renal cancer (CMS/HCC)       PAST SURGICAL HISTORY     Past Surgical History:   Procedure Laterality Date   • APPENDECTOMY     • CARDIAC CATHETERIZATION     • NEPHRECTOMY     • TENDON REPAIR        SOCIAL HISTORY     Social History     Socioeconomic History   • Marital status:      Spouse name: Not on file   • Number of children: Not on file   • Years of education: Not on file   • Highest education level: Not on file   Tobacco Use   • Smoking status: Never Smoker   • Smokeless tobacco: Never Used   Substance and Sexual Activity   • Alcohol use: Yes     Comment: occasional   • Drug use: No   • Sexual activity: Defer      ALLERGIES   Morphine   MEDICATIONS     Current Outpatient Medications   Medication Sig Dispense Refill   • aspirin 81 MG EC tablet Take 81 mg by mouth Daily.     • atorvastatin (LIPITOR) 40 MG tablet Take 1 tablet by mouth Daily. 30 tablet 6   • fluticasone (FLONASE) 50 MCG/ACT nasal spray SPRAY 2 SPRAYS IN EACH NOSTRIL EVERY DAY 16 g 10   • isosorbide mononitrate (IMDUR) 30 MG 24 hr tablet TAKE 1 TABLET(30 MG) BY MOUTH DAILY 90 tablet 2   • metoprolol succinate XL (TOPROL-XL) 25 MG 24 hr tablet TAKE 1 TABLET BY MOUTH DAILY 90 tablet 2   • Nivolumab (OPDIVO IV) Infuse  into a venous catheter.     • lisinopril (PRINIVIL,ZESTRIL) 20 MG tablet Take 0.5-1 tablets by mouth Daily. 30 tablet 0     No current facility-administered medications for this visit.         The following portions of the patient's history were reviewed and updated as appropriate:  allergies, current medications, past family history, past medical history, past social history, past surgical history and problem list.        Assessment/Plan   Jimmie was seen today for blood pressure.    Diagnoses and all orders for this visit:    Essential hypertension    Other orders  -     lisinopril (PRINIVIL,ZESTRIL) 20 MG tablet; Take 0.5-1 tablets by mouth Daily.        Stop zestoretic 20/12.5 qd  Change to lisinopril 20mg qd  Return bp check 2 weeks.   If still low, will tell him to cut in half                No follow-ups on file.                  This document has been electronically signed by Jimmie Mathew MD on August 5, 2020 15:59

## 2020-09-30 ENCOUNTER — TRANSCRIBE ORDERS (OUTPATIENT)
Dept: LAB | Facility: HOSPITAL | Age: 65
End: 2020-09-30

## 2020-09-30 DIAGNOSIS — R59.0 LOCALIZED ENLARGED LYMPH NODES: Primary | ICD-10-CM

## 2020-12-17 ENCOUNTER — OFFICE VISIT (OUTPATIENT)
Dept: CARDIOLOGY | Facility: CLINIC | Age: 65
End: 2020-12-17

## 2020-12-17 DIAGNOSIS — I25.10 CORONARY ARTERY DISEASE INVOLVING NATIVE CORONARY ARTERY OF NATIVE HEART WITHOUT ANGINA PECTORIS: Primary | ICD-10-CM

## 2020-12-17 PROCEDURE — 99442 PR PHYS/QHP TELEPHONE EVALUATION 11-20 MIN: CPT | Performed by: INTERNAL MEDICINE

## 2020-12-17 RX ORDER — ISOSORBIDE MONONITRATE 30 MG/1
TABLET, EXTENDED RELEASE ORAL
Qty: 90 TABLET | Refills: 2 | Status: SHIPPED | OUTPATIENT
Start: 2020-12-17

## 2020-12-17 NOTE — PROGRESS NOTES
Pulmonary Arterial Hypertension & Heart Failure   Kvng Frank M.D., Ph.D., [facility]              Non-Face-To-Face Scheduled Telephone Service    Jimmie Ortega is a 65 y.o. male who has requested telephone service in lieu of a follow-up appointment for results and advice.  This patient is an established patient.  There is no face-to-face service planned within the next 24 hours.  There also has been no E/M in the past 7 days.    You have chosen to receive care through a telephone visit. Do you consent to use a telephone visit for your medical care today? Yes     This phone visit is conducted due to COIVD. Limitations were explained, including inability to perform a physical examination and inability to perform vital signs. Patient was agreeable to proceeding with these limitations, as I personally explained them.       Jimmie Ortega is a 65 y.o. male who is followed in my clinic typically. He has ASCAD. He has had no angina. He is medication compliant. He tells me that his CA has returned. He has had thyroid surgery. He is currently on immune therapy.     Review of Systems   Cardiovascular: Negative.    Respiratory: Negative.      family history includes Brain cancer in his father; Heart attack in his paternal grandfather; Heart disease in his brother and another family member; Hypertension in an other family member.   reports that he has never smoked. He has never used smokeless tobacco. He reports current alcohol use. He reports that he does not use drugs.  Allergies   Allergen Reactions   • Morphine Nausea Only       Current Outpatient Medications:   •  aspirin 81 MG EC tablet, Take 81 mg by mouth Daily., Disp: , Rfl:   •  atorvastatin (LIPITOR) 40 MG tablet, Take 1 tablet by mouth Daily., Disp: 30 tablet, Rfl: 6  •  fluticasone (FLONASE) 50 MCG/ACT nasal spray, SPRAY 2 SPRAYS IN EACH NOSTRIL EVERY DAY, Disp: 16 g, Rfl: 10  •  isosorbide mononitrate (IMDUR) 30 MG 24 hr tablet, TAKE 1  TABLET(30 MG) BY MOUTH DAILY, Disp: 90 tablet, Rfl: 2  •  lisinopril (PRINIVIL,ZESTRIL) 20 MG tablet, TAKE 1/2 TO 1 TABLET BY MOUTH DAILY, Disp: 90 tablet, Rfl: 3  •  metoprolol succinate XL (TOPROL-XL) 25 MG 24 hr tablet, TAKE 1 TABLET BY MOUTH DAILY, Disp: 90 tablet, Rfl: 2  •  Nivolumab (OPDIVO IV), Infuse  into a venous catheter., Disp: , Rfl:       DATA REVIEWED:     TTE/FRANKIE:  Results for orders placed in visit on 10/08/18   Adult Transthoracic Echo Complete W/ Cont if Necessary Per Protocol    Narrative · Left ventricular systolic function is normal. Calculated LVEF 70%. Mild   concentric hypertrophy with normal diastolic function.  · Right ventricle systolic function is normal.  · Aortic sclerosis without significant stenosis or regurgitation.          --------------------------------------------------------------------------------------------------  LABS:     The 10-year CVD risk score (Аннаino, et al., 2008) is: 19.6%    Values used to calculate the score:      Age: 65 years      Sex: Male      Diabetic: No      Tobacco smoker: No      Systolic Blood Pressure: 92 mmHg      Is BP treated: Yes      HDL Cholesterol: 27 mg/dL      Total Cholesterol: 169 mg/dL  Lab Results   Component Value Date    GLUCOSE 114 (H) 04/17/2020    BUN 22 04/17/2020    CREATININE 1.30 (H) 04/17/2020    EGFRIFNONA 55 (L) 04/17/2020    BCR 16.9 04/17/2020    K 4.5 04/17/2020    CO2 22.7 04/17/2020    CALCIUM 9.6 04/17/2020    ALBUMIN 4.20 04/17/2020    AST 25 03/31/2020    ALT 22 03/31/2020     Lab Results   Component Value Date    WBC 7.81 10/02/2019    HGB 14.5 10/02/2019    HCT 42.4 10/02/2019    MCV 87.8 10/02/2019     10/02/2019     Lab Results   Component Value Date    CHOL 169 03/31/2020    TRIG 184 (H) 03/31/2020    HDL 27 (L) 03/31/2020     (H) 03/31/2020     No results found for: TSH, Z5RZAIP, C2WYJVM, THYROIDAB  No results found for: CKTOTAL, CKMB, CKMBINDEX, TROPONINI, TROPONINT  No results found for:  HGBA1C  No results found for: DDIMER  Lab Results   Component Value Date    ALT 22 03/31/2020     No results found for: HGBA1C  Lab Results   Component Value Date    CREATININE 1.30 (H) 04/17/2020     No results found for: IRON, TIBC, FERRITIN  No results found for: INR, PROTIME    Assessment/Plan     1. Coronary artery disease involving native coronary artery of native heart without angina pectoris. CCS 0. Seems stable. Continue meds. Will arrange follow-up in 3 months or so.       Return in about 3 months (around 3/17/2021).        Time: >10    Kvng Frank MD PhD

## 2021-03-15 RX ORDER — METOPROLOL SUCCINATE 25 MG/1
TABLET, EXTENDED RELEASE ORAL
Qty: 90 TABLET | Refills: 2 | Status: SHIPPED | OUTPATIENT
Start: 2021-03-15

## 2021-07-09 RX ORDER — LISINOPRIL 20 MG/1
TABLET ORAL
Qty: 90 TABLET | Refills: 3 | Status: SHIPPED | OUTPATIENT
Start: 2021-07-09

## 2021-08-11 ENCOUNTER — LAB (OUTPATIENT)
Dept: LAB | Facility: HOSPITAL | Age: 66
End: 2021-08-11

## 2021-08-11 ENCOUNTER — TRANSCRIBE ORDERS (OUTPATIENT)
Dept: LAB | Facility: HOSPITAL | Age: 66
End: 2021-08-11

## 2021-08-11 DIAGNOSIS — B35.9 TINEA: Primary | ICD-10-CM

## 2021-08-11 DIAGNOSIS — B35.9 TINEA: ICD-10-CM

## 2021-08-11 DIAGNOSIS — L08.1 ERYTHRASMA: ICD-10-CM

## 2021-08-11 LAB — KOH PREP NAIL: NORMAL

## 2021-08-11 PROCEDURE — 87186 SC STD MICRODIL/AGAR DIL: CPT

## 2021-08-11 PROCEDURE — 87077 CULTURE AEROBIC IDENTIFY: CPT

## 2021-08-11 PROCEDURE — 87070 CULTURE OTHR SPECIMN AEROBIC: CPT

## 2021-08-11 PROCEDURE — 87220 TISSUE EXAM FOR FUNGI: CPT

## 2021-08-11 PROCEDURE — 87205 SMEAR GRAM STAIN: CPT

## 2021-08-11 PROCEDURE — 87102 FUNGUS ISOLATION CULTURE: CPT

## 2021-08-13 LAB
BACTERIA SPEC AEROBE CULT: NORMAL
GRAM STN SPEC: NORMAL
GRAM STN SPEC: NORMAL

## 2021-09-01 ENCOUNTER — LAB (OUTPATIENT)
Dept: LAB | Facility: HOSPITAL | Age: 66
End: 2021-09-01

## 2021-09-01 PROCEDURE — 87635 SARS-COV-2 COVID-19 AMP PRB: CPT | Performed by: FAMILY MEDICINE

## 2021-09-08 LAB — FUNGUS WND CULT: NORMAL
